# Patient Record
Sex: FEMALE | Race: WHITE | Employment: FULL TIME | URBAN - METROPOLITAN AREA
[De-identification: names, ages, dates, MRNs, and addresses within clinical notes are randomized per-mention and may not be internally consistent; named-entity substitution may affect disease eponyms.]

---

## 2019-08-19 ENCOUNTER — OFFICE VISIT (OUTPATIENT)
Dept: OBGYN CLINIC | Age: 19
End: 2019-08-19
Payer: COMMERCIAL

## 2019-08-19 VITALS
BODY MASS INDEX: 33.49 KG/M2 | HEIGHT: 65 IN | SYSTOLIC BLOOD PRESSURE: 120 MMHG | WEIGHT: 201 LBS | DIASTOLIC BLOOD PRESSURE: 84 MMHG

## 2019-08-19 DIAGNOSIS — N92.6 IRREGULAR MENSES: ICD-10-CM

## 2019-08-19 DIAGNOSIS — Z01.419 WELL WOMAN EXAM: Primary | ICD-10-CM

## 2019-08-19 PROCEDURE — 99385 PREV VISIT NEW AGE 18-39: CPT | Performed by: NURSE PRACTITIONER

## 2019-08-19 RX ORDER — NORETHINDRONE ACETATE AND ETHINYL ESTRADIOL 1MG-20(21)
1 KIT ORAL DAILY
Qty: 1 PACKET | Refills: 12 | Status: SHIPPED | OUTPATIENT
Start: 2019-08-19 | End: 2020-07-31 | Stop reason: ALTCHOICE

## 2019-08-19 SDOH — HEALTH STABILITY: MENTAL HEALTH: HOW OFTEN DO YOU HAVE A DRINK CONTAINING ALCOHOL?: MONTHLY OR LESS

## 2019-08-19 ASSESSMENT — PATIENT HEALTH QUESTIONNAIRE - PHQ9
SUM OF ALL RESPONSES TO PHQ QUESTIONS 1-9: 0
SUM OF ALL RESPONSES TO PHQ QUESTIONS 1-9: 0
SUM OF ALL RESPONSES TO PHQ9 QUESTIONS 1 & 2: 0
2. FEELING DOWN, DEPRESSED OR HOPELESS: 0
1. LITTLE INTEREST OR PLEASURE IN DOING THINGS: 0

## 2019-08-19 NOTE — PROGRESS NOTES
History and Physical  [address], [address] [facility] [address], [address] [facility] [phone]   Fax [phone]  Fatou Gonzales  2019              18 y.o. Chief Complaint   Patient presents with    Gynecologic Exam       Patient's last menstrual period was 2019. Primary Care Physician: No primary care provider on file. The patient was seen and examined. She has no chief complaint today and is here for her annual exam. Patient is here to establish care. Complaining of irregular menses for many years. Patient reports periods occur every 1-2 months. Sometimes will get 2 in one month. Currently sexually active. Desires to start on on oral contraception to regulate menses. Denies personal or family hx of blood clot to leg, lung or brain. Denies smoking. Her bowels are regular. There are no voiding complaints. She denies any bloating. She denies vaginal discharge and was counseled on STD's and the need for barrier contraception. HPI : Fatou Gonzales is a 22 y.o. female     Annual exam  Irregular menses- desires to start on OCPs for menstrual regulation  ________________________________________________________________________  OB History    Para Term  AB Living   0 0 0 0 0 0   SAB TAB Ectopic Molar Multiple Live Births   0 0 0 0 0 0     History reviewed. No pertinent past medical history.                                                                 Past Surgical History:   Procedure Laterality Date    KNEE SURGERY  2018     Family History   Problem Relation Age of Onset    No Known Problems Mother     No Known Problems Father     No Known Problems Sister     No Known Problems Brother     Colon Cancer Paternal Grandmother      Social History     Socioeconomic History    Marital status: Single     Spouse name: Not on file    Number of children: Not on file    Years of education: Not on file   Rich Qiu Limb Weakness  Dermatological ROS: No Rash, Itching, Hives, Mole Changes or Cancer                                                                                                                                                                                                                                  PHYSICAL Exam:     Constitutional:  Vitals:    08/19/19 0910   BP: 120/84   Site: Right Upper Arm   Position: Sitting   Cuff Size: Medium Adult   Weight: 201 lb (91.2 kg)   Height: 5' 5\" (1.651 m)         General Appearance: This  is a well Developed, well Nourished, well groomed female. Her BMI was reviewed. Nutritional decision making was discussed. Skin:  There was a Normal Inspection of the skin without rashes or lesions. There were no rashes. (Papular, Maculopapular, Hives, Pustular, Macular)     There were no lesions (Ulcers, Erythema, Abn. Appearing Nevi)            Lymphatic:  No Lymph Nodes were Palpable in the neck , axilla or groin.  0 # Of Lymph Nodes; Location ; Character [Normal]  [Shotty] [Tender] [Enlarged]     Neck and EENT:  The neck was supple. There were no masses   The thyroid was not enlarged and had no masses. Perrla, EOMI B/L, TMI B/L No Abnormalities. Throat inspected-No exudates or Masses, Nares Patent No Masses        Respiratory: The lungs were auscultated and found to be clear. There were no rales, rhonchi or wheezes. There was a good respiratory effort. Cardiovascular: The heart was in a regular rate and rhythm. . No S3 or S4. There was no murmur appreciated. Location, grade, and radiation are not applicable. Extremities: The patients extremities were without calf tenderness, edema, or varicosities. There was full range of motion in all four extremities. Pulses in all four extremities were appreciated and are 2/4. Abdomen: The abdomen was soft and non-tender. There were good bowel sounds in all quadrants and there was no guarding, rebound or rigidity.   On

## 2019-08-19 NOTE — PATIENT INSTRUCTIONS
take one as soon as you remember you forgot them. Then read the pill label or call your doctor about instructions on how to take your missed pills. Use a backup method of birth control or don't have intercourse for 7 days. Pregnancy is more likely if you miss more than 1 pill. · If you had intercourse, you can use emergency contraception to help prevent pregnancy. The most effective emergency contraception is the copper IUD (inserted by a doctor). You can also get emergency contraceptive pills without a prescription at [facility]. What else do you need to know? · The pill can have side effects. ? You may have very light or skipped periods. ? You may have bleeding between periods (spotting). This usually decreases after 3 to 4 months. ? You may have mood changes, less interest in sex, or weight gain. · The pill may reduce acne, heavy bleeding and cramping, and symptoms of premenstrual syndrome. · Check with your doctor before you use any other medicines, including over-the-counter medicines, vitamins, herbal products, and supplements. Birth control hormones may not work as well to prevent pregnancy when combined with other medicines. · The pill doesn't protect against sexually transmitted infection (STIs), such as herpes or HIV/AIDS. If you're not sure whether your sex partner might have an STI, use a condom to protect against disease. When should you call for help? Call your doctor now or seek immediate medical care if:    · You have severe belly pain.     · You have signs of a blood clot, such as:  ? Pain in your calf, back of the knee, thigh, or groin. ?  Redness and swelling in your leg or groin.     · You have blurred vision or other problems seeing.     · You have a severe headache.     · You have severe trouble breathing.    Watch closely for changes in your health, and be sure to contact your doctor if:    · You think you might be pregnant.     · You think you may be depressed.     · You think

## 2020-07-31 ENCOUNTER — HOSPITAL ENCOUNTER (OUTPATIENT)
Age: 20
Setting detail: SPECIMEN
Discharge: HOME | End: 2020-07-31
Payer: COMMERCIAL

## 2020-07-31 ENCOUNTER — OFFICE VISIT (OUTPATIENT)
Dept: OBGYN CLINIC | Age: 20
End: 2020-07-31
Payer: COMMERCIAL

## 2020-07-31 VITALS
TEMPERATURE: 97.6 F | SYSTOLIC BLOOD PRESSURE: 102 MMHG | BODY MASS INDEX: 32.49 KG/M2 | DIASTOLIC BLOOD PRESSURE: 60 MMHG | WEIGHT: 195 LBS | HEIGHT: 65 IN

## 2020-07-31 LAB
CONTROL: NORMAL
DIRECT EXAM: ABNORMAL
HBV SURFACE AB TITR SER: 43.27 MIU/ML
HEPATITIS C ANTIBODY: NONREACTIVE
HIV AG/AB: NONREACTIVE
Lab: ABNORMAL
PREGNANCY TEST URINE, POC: NEGATIVE
SPECIMEN DESCRIPTION: ABNORMAL
T. PALLIDUM, IGG: NONREACTIVE

## 2020-07-31 PROCEDURE — 36415 COLL VENOUS BLD VENIPUNCTURE: CPT

## 2020-07-31 PROCEDURE — 86780 TREPONEMA PALLIDUM: CPT

## 2020-07-31 PROCEDURE — 87389 HIV-1 AG W/HIV-1&-2 AB AG IA: CPT

## 2020-07-31 PROCEDURE — 99395 PREV VISIT EST AGE 18-39: CPT | Performed by: NURSE PRACTITIONER

## 2020-07-31 PROCEDURE — 86803 HEPATITIS C AB TEST: CPT

## 2020-07-31 PROCEDURE — 87480 CANDIDA DNA DIR PROBE: CPT

## 2020-07-31 PROCEDURE — 81025 URINE PREGNANCY TEST: CPT | Performed by: NURSE PRACTITIONER

## 2020-07-31 PROCEDURE — 87491 CHLMYD TRACH DNA AMP PROBE: CPT

## 2020-07-31 PROCEDURE — 86696 HERPES SIMPLEX TYPE 2 TEST: CPT

## 2020-07-31 PROCEDURE — 86694 HERPES SIMPLEX NES ANTBDY: CPT

## 2020-07-31 PROCEDURE — 87660 TRICHOMONAS VAGIN DIR PROBE: CPT

## 2020-07-31 PROCEDURE — 86317 IMMUNOASSAY INFECTIOUS AGENT: CPT

## 2020-07-31 PROCEDURE — 87591 N.GONORRHOEAE DNA AMP PROB: CPT

## 2020-07-31 PROCEDURE — 86695 HERPES SIMPLEX TYPE 1 TEST: CPT

## 2020-07-31 PROCEDURE — 87510 GARDNER VAG DNA DIR PROBE: CPT

## 2020-07-31 RX ORDER — NORGESTIMATE AND ETHINYL ESTRADIOL 7DAYSX3 28
1 KIT ORAL DAILY
Qty: 28 TABLET | Refills: 12 | Status: SHIPPED | OUTPATIENT
Start: 2020-07-31 | End: 2021-06-03

## 2020-07-31 ASSESSMENT — PATIENT HEALTH QUESTIONNAIRE - PHQ9
SUM OF ALL RESPONSES TO PHQ9 QUESTIONS 1 & 2: 0
1. LITTLE INTEREST OR PLEASURE IN DOING THINGS: 0
SUM OF ALL RESPONSES TO PHQ QUESTIONS 1-9: 0
2. FEELING DOWN, DEPRESSED OR HOPELESS: 0
SUM OF ALL RESPONSES TO PHQ QUESTIONS 1-9: 0

## 2020-07-31 NOTE — PROGRESS NOTES
History and Physical  [address], [address] [facility] [address], [address] [facility] [phone]   Fax [phone]  Manolo Mcmanus  2020              19 y.o. Chief Complaint   Patient presents with    Gynecologic Exam       Patient's last menstrual period was 2020. Primary Care Physician: No primary care provider on file. The patient was seen and examined. She has no chief complaint today and is here for her annual exam.  Her bowels are regular. There are no voiding complaints. She denies any bloating. She denies vaginal discharge and was counseled on STD's and the need for barrier contraception. HPI : Manolo Mcmanus is a 20 y.o. female [facility]    Annual exam  Complaining of irregular menses while on OCPs   Sometimes misses pills but desires to continue on OCP after discussing different methods. Last intercourse was several months ago. Completely stopped taking pills 1 month ago to see how her periods are without taking them, interested in restarting pill. Will change OCP to see if it helps with intermenstrual bleeding. Hx of chlamydia in urine in May at [facility] [place].   Did not retest.  Desires STD screening  ________________________________________________________________________  OB History    Para Term  AB Living   0 0 0 0 0 0   SAB TAB Ectopic Molar Multiple Live Births   0 0 0 0 0 0     Past Medical History:   Diagnosis Date    Chlamydia contact, treated 2020                                                                   Past Surgical History:   Procedure Laterality Date    KNEE SURGERY  2018     Family History   Problem Relation Age of Onset    No Known Problems Mother     No Known Problems Father     No Known Problems Sister     No Known Problems Brother     Colon Cancer Paternal Grandmother      Social History     Socioeconomic History    Marital status: Single     Spouse name: Not on file    Number of children: Not on file    Years of education: Not on file    Highest education level: Not on file   Occupational History    Not on file   Social Needs    Financial resource strain: Not on file    Food insecurity     Worry: Not on file     Inability: Not on file    Transportation needs     Medical: Not on file     Non-medical: Not on file   Tobacco Use    Smoking status: Never Smoker    Smokeless tobacco: Never Used   Substance and Sexual Activity    Alcohol use: Yes     Frequency: Monthly or less    Drug use: Never    Sexual activity: Yes   Lifestyle    Physical activity     Days per week: Not on file     Minutes per session: Not on file    Stress: Not on file   Relationships    Social connections     Talks on phone: Not on file     Gets together: Not on file     Attends Mandaeism service: Not on file     Active member of club or organization: Not on file     Attends meetings of clubs or organizations: Not on file     Relationship status: Not on file    Intimate partner violence     Fear of current or ex partner: Not on file     Emotionally abused: Not on file     Physically abused: Not on file     Forced sexual activity: Not on file   Other Topics Concern    Not on file   Social History Narrative    Not on file       MEDICATIONS:  Current Outpatient Medications   Medication Sig Dispense Refill    Norgestim-Eth Estrad Triphasic (ORTHO TRI-CYCLEN, 28,) 0.18/0.215/0.25 MG-35 MCG TABS Take 1 Package by mouth daily for 28 days Barrier contraception is recommended. 28 tablet 12     No current facility-administered medications for this visit.             ALLERGIES:  Allergies as of 07/31/2020    (No Known Allergies)       Symptoms of decreased mood absent  Symptoms of anhedonia absent    **If either question is answered in a  positive fashion then complete the PHQ9 Scoring Evaluation and make the appropriate referral**      Immunization status: stated as current, but no records available. Gynecologic History:  Menarche: 6 yo  Menopause at [address] [place] yo     Patient's last menstrual period was 07/26/2020. Sexually Active: Yes    STD History: Yes hx chlamydia in May     Permanent Sterilization: No   Reversible Birth Control: No        Hormone Replacement Exposure: No      Genetic Qualified Family History of Breast, Ovarian , Colon or Uterine Cancer: No     If YES see scanned worksheet. Preventative Health Testing:    Health Maintenance:  Health Maintenance Due   Topic Date Due    Varicella vaccine (1 of 2 - 2-dose childhood series) 11/05/2000    HPV vaccine (1 - 2-dose series) 11/05/2010    HIV screen  11/05/2014    Chlamydia screen  11/05/2015    DTaP/Tdap/Td vaccine (1 - Tdap) 11/05/2018       Date of Last Pap Smear: NA  Abnormal Pap Smear History: NA  Colposcopy History:   Date of Last Mammogram: NA  Date of Last Colonoscopy:   Date of Last Bone Density:      ________________________________________________________________________        REVIEW OF SYSTEMS:    yes   A minimum of an eleven point review of systems was completed. Review Of Systems (11 point):  Constitutional: No fever, chills or malaise; No weight change or fatigue  Head and Eyes: No vision, Headache, Dizziness or trauma in last 12 months  ENT ROS: No hearing, Tinnitis, sinus or taste problems  Hematological and Lymphatic ROS:No Lymphoma, Von Willebrand's, Hemophillia or Bleeding History  Psych ROS: No Depression, Homicidal thoughts,suicidal thoughts, or anxiety  Breast ROS: No prior breast abnormalities or lumps  Respiratory ROS: No SOB, Pneumoniae,Cough, or Pulmonary Embolism History  Cardiovascular ROS: No Chest Pain with Exertion, Palpitations, Syncope, Edema, Arrhythmia  Gastrointestinal ROS: No Indigestion, Heartburn, Nausea, vomiting, Diarrhea, Constipation,or Bowel Changes; No Bloody Stools or melena  Genito-Urinary ROS: No Dysuria, Hematuria or Nocturia.  No Urinary Incontinence or Vaginal Discharge  Musculoskeletal ROS: No Arthralgia, Arthritis,Gout,Osteoporosis or Rheumatism  Neurological ROS: No CVA, Migraines, Epilepsy, Seizure Hx, or Limb Weakness  Dermatological ROS: No Rash, Itching, Hives, Mole Changes or Cancer                                                                                                                                                                                                                                  PHYSICAL Exam:     Constitutional:  Vitals:    07/31/20 0908   BP: 102/60   Site: Right Upper Arm   Position: Sitting   Cuff Size: Medium Adult   Temp: 97.6 °F (36.4 °C)   Weight: 195 lb (88.5 kg)   Height: 5' 5\" (1.651 m)         General Appearance: This  is a well Developed, well Nourished, well groomed female. Her BMI was reviewed. Nutritional decision making was discussed. Skin:  There was a Normal Inspection of the skin without rashes or lesions. There were no rashes. (Papular, Maculopapular, Hives, Pustular, Macular)     There were no lesions (Ulcers, Erythema, Abn. Appearing Nevi)            Lymphatic:  No Lymph Nodes were Palpable in the neck , axilla or groin.  0 # Of Lymph Nodes; Location ; Character [Normal]  [Shotty] [Tender] [Enlarged]     Neck and EENT:  The neck was supple. There were no masses   The thyroid was not enlarged and had no masses. Perrla, EOMI B/L, TMI B/L No Abnormalities. Throat inspected-No exudates or Masses, Nares Patent No Masses        Respiratory: The lungs were auscultated and found to be clear. There were no rales, rhonchi or wheezes. There was a good respiratory effort. Cardiovascular: The heart was in a regular rate and rhythm. . No S3 or S4. There was no murmur appreciated. Location, grade, and radiation are not applicable. Extremities: The patients extremities were without calf tenderness, edema, or varicosities. There was full range of motion in all four extremities.  Pulses in all four extremities were appreciated and are 2/4. Abdomen: The abdomen was soft and non-tender. There were good bowel sounds in all quadrants and there was no guarding, rebound or rigidity. On evaluation there was no evidence of hepatosplenomegaly and there was no costal vertebral landy tenderness bilaterally. No hernias were appreciated. Abdominal Scars: intact    Psych: The patient had a normal Orientation to: Time, Place, Person, and Situation  There is no Mood / Affect changes    Breast:  (Chest)  declines  Self breast exams were reviewed in detail. Literature was given. Pelvic Exam:  Vulva and vagina appear normal. Bimanual exam reveals normal uterus and adnexa. Rectal Exam:  exam declined by patient          Musculosk:  Normal Gait and station was noted. Digits were evaluated without abnormal findings. Range of motion, stability and strength were evaluated and found to be appropriate for the patients age. ASSESSMENT:      20 y.o. Annual   Diagnosis Orders   1. Well woman exam     2. Screening for STDs (sexually transmitted diseases)  C.trachomatis N.gonorrhoeae DNA    VAGINITIS DNA PROBE    Hepatitis C Antibody    HERPES PROFILE    HIV Screen    T. pallidum Ab    Hepatitis B Surface Antibody   3. Irregular periods  POCT urine pregnancy          Chief Complaint   Patient presents with    Gynecologic Exam          Past Medical History:   Diagnosis Date    Chlamydia contact, treated 05/2020         There are no active problems to display for this patient. Hereditary Breast, Ovarian, Colon and Uterine Cancer screening Done. Tobacco & Secondary smoke risks reviewed; instructed on cessation and avoidance      Counseling Completed:  Preventative Health Recommendations and Follow up. The patient was informed of the recommended preventative health recommendations. 1. Annuals every year; Cytology collections per prevailing guidelines.    2. Mammograms begin every year at 37 yo if no abnormalities are found and no family history. 3. Bone density studies every 2-3 years. Begin at 71 yo. If no fracture history or osteoporosis family history. (significant). 4. Colonoscopy begin at 38 yo. Repeat every ten years if negative and no family history. 5. Calcium of 6783-0890 mg/day in split dosing  6. Vitamin D 400-800 IU/day  7. All other preventative health recommendations will be managed by the patients Primary care physician. Counseling Hormonal Based Birth Control:      The patient was seen and counseled on all forms of birth control both male and female  reversible and non. She is aware that hormonal based birth control may increase her risk of developing a blood clot which may increase her morbidity and or mortality. She was counseled on alternate non hormonal based contraception options. We discussed that smoking and any hormonal based contraception may increase the patients risks of developing these life threatening blood clots. All patients are encouraged to stop smoking at the time of contraceptive counseling. Cessation programs were reviewed. The patient was instructed to use barrier contraception for sexually transmitted disease prevention. The patient was also informed of antibiotics decreasing contraceptive efficacy and the need for barrier contraception from the onset of her antibiotic dosing and through a minimum of thirty days from antibiotic cessation. The life threatening side effect profile was reviewed in detail this includes but is not limited to shortness of breath, chest pain, severe or persistent headaches, or calf pain. If any of these occur the patient has been instructed to stop using her hormonal based contraception, notify the office, and go to the emergency department or call 911.     The patient denied any personal history of blood clots in her leg, lung, or heart and denied any family history of stroke, TIA, sudden cardiac death < 36 y.o.,pulmonary embolism, or deep venous thrombosis. PLAN:  Return in about 1 year (around 7/31/2021) for annual.   Vaginal cultures collected. Lab slips given. Urine pregnancy test negative. Prescription for Ortho tri-cyclen sent to pharmacy. Repeat Annual every 1 year  Cervical Cytology Evaluation begins at 24years old. If Negative Cytology, Follow-up screening per current guidelines. Mammograms every 1 year. If 37 yo and last mammogram was negative. Calcium and Vitamin D dosing reviewed. Colonoscopy screening reviewed as well as onset for bone density testing. Birth control and barrier recommendations discussed. STD counseling and prevention reviewed. Gardisil counseling completed for all patients 10-37 yo. Routine health maintenance per patients PCP.   Orders Placed This Encounter   Procedures    C.trachomatis N.gonorrhoeae DNA     Standing Status:   Future     Number of Occurrences:   1     Standing Expiration Date:   7/31/2021    VAGINITIS DNA PROBE     Standing Status:   Future     Number of Occurrences:   1     Standing Expiration Date:   7/31/2021    Hepatitis C Antibody     Standing Status:   Future     Number of Occurrences:   1     Standing Expiration Date:   7/31/2021    HERPES PROFILE     Standing Status:   Future     Number of Occurrences:   1     Standing Expiration Date:   8/30/2020    HIV Screen     Standing Status:   Future     Number of Occurrences:   1     Standing Expiration Date:   7/31/2021    T. pallidum Ab     Standing Status:   Future     Number of Occurrences:   1     Standing Expiration Date:   8/30/2020    Hepatitis B Surface Antibody     Standing Status:   Future     Number of Occurrences:   1     Standing Expiration Date:   7/31/2021    POCT urine pregnancy

## 2020-08-03 ENCOUNTER — TELEPHONE (OUTPATIENT)
Dept: OBGYN CLINIC | Age: 20
End: 2020-08-03

## 2020-08-03 LAB
C TRACH DNA GENITAL QL NAA+PROBE: NEGATIVE
N. GONORRHOEAE DNA: NEGATIVE
SPECIMEN DESCRIPTION: NORMAL

## 2020-08-03 RX ORDER — METRONIDAZOLE 500 MG/1
500 TABLET ORAL 2 TIMES DAILY
Qty: 14 TABLET | Refills: 0 | Status: SHIPPED | OUTPATIENT
Start: 2020-08-03 | End: 2020-08-10

## 2020-08-03 NOTE — TELEPHONE ENCOUNTER
----- Message from RIAZ Oscar - CNP sent at 8/3/2020  7:41 AM EDT -----  +BV- flagyl 500mg PO BID x 7days  + hep B antibodies- please ask patient if she previously received hep B vaccine. If not, let me know.

## 2020-08-03 NOTE — TELEPHONE ENCOUNTER
Per Johnnye Lanes pt notified of +BV on culture and pt to get Flagyl 500mg sent to pt pharm. 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

## 2020-08-04 ENCOUNTER — TELEPHONE (OUTPATIENT)
Dept: OBGYN CLINIC | Age: 20
End: 2020-08-04

## 2020-08-04 LAB
HERPES SIMPLEX VIRUS 1 IGG: 0.31
HERPES SIMPLEX VIRUS 2 IGG: 0.02
HERPES TYPE 1/2 IGM COMBINED: 0.93

## 2020-08-04 NOTE — TELEPHONE ENCOUNTER
Patient notified of results and recommendations, patient not currently having any symptoms, will call the office if symptoms develop.

## 2020-08-04 NOTE — TELEPHONE ENCOUNTER
----- Message from RIAZ Dc CNP sent at 8/4/2020  2:33 PM EDT -----  Herpes type 1/2 IgM combined - equivocal.  If patient is having symptoms, please treat with valtrex 1 gram PO BID 10 days. If patient would like- may repeat herpes profile in 3-6 months to see if herpes is positive.

## 2020-12-03 ENCOUNTER — TELEPHONE (OUTPATIENT)
Dept: OBGYN CLINIC | Age: 20
End: 2020-12-03

## 2020-12-03 RX ORDER — FLUCONAZOLE 150 MG/1
150 TABLET ORAL ONCE
Qty: 2 TABLET | Refills: 0 | Status: SHIPPED | OUTPATIENT
Start: 2020-12-03 | End: 2020-12-03

## 2021-06-03 ENCOUNTER — OFFICE VISIT (OUTPATIENT)
Dept: OBGYN CLINIC | Age: 21
End: 2021-06-03
Payer: COMMERCIAL

## 2021-06-03 ENCOUNTER — HOSPITAL ENCOUNTER (OUTPATIENT)
Age: 21
Setting detail: SPECIMEN
Discharge: HOME | End: 2021-06-03
Payer: COMMERCIAL

## 2021-06-03 VITALS
HEIGHT: 65 IN | WEIGHT: 216 LBS | SYSTOLIC BLOOD PRESSURE: 114 MMHG | BODY MASS INDEX: 35.99 KG/M2 | DIASTOLIC BLOOD PRESSURE: 70 MMHG

## 2021-06-03 DIAGNOSIS — Z20.828 HERPES EXPOSURE: ICD-10-CM

## 2021-06-03 DIAGNOSIS — Z20.828 HERPES EXPOSURE: Primary | ICD-10-CM

## 2021-06-03 PROCEDURE — G8428 CUR MEDS NOT DOCUMENT: HCPCS | Performed by: NURSE PRACTITIONER

## 2021-06-03 PROCEDURE — 86694 HERPES SIMPLEX NES ANTBDY: CPT

## 2021-06-03 PROCEDURE — 99213 OFFICE O/P EST LOW 20 MIN: CPT | Performed by: NURSE PRACTITIONER

## 2021-06-03 PROCEDURE — G8417 CALC BMI ABV UP PARAM F/U: HCPCS | Performed by: NURSE PRACTITIONER

## 2021-06-03 PROCEDURE — 86696 HERPES SIMPLEX TYPE 2 TEST: CPT

## 2021-06-03 PROCEDURE — 86695 HERPES SIMPLEX TYPE 1 TEST: CPT

## 2021-06-03 PROCEDURE — 4004F PT TOBACCO SCREEN RCVD TLK: CPT | Performed by: NURSE PRACTITIONER

## 2021-06-03 PROCEDURE — 36415 COLL VENOUS BLD VENIPUNCTURE: CPT

## 2021-06-03 RX ORDER — ARIPIPRAZOLE 2 MG/1
TABLET ORAL
COMMUNITY
Start: 2021-05-13 | End: 2022-09-07

## 2021-06-03 RX ORDER — NORGESTIMATE AND ETHINYL ESTRADIOL 7DAYSX3 28
1 KIT ORAL
COMMUNITY
Start: 2020-07-31 | End: 2021-07-27 | Stop reason: SDUPTHER

## 2021-06-03 NOTE — PROGRESS NOTES
Shar Feritas  6/3/2021    YOB: 2000          The patient was seen today. She is here regarding orders for herp profile. Last herpes profile was completed in 2020 and was equivical for IgM. Desires to determine if/what type of HSV she has. States she may have had a lesion on her lip. Her bowels are regular and she is voiding without difficulty. HPI:  Shar Freitas is a 23 y.o. female  orders for herpes profile       OB History    Para Term  AB Living   0 0 0 0 0 0   SAB TAB Ectopic Molar Multiple Live Births   0 0 0 0 0 0       Past Medical History:   Diagnosis Date    Chlamydia contact, treated 2020       Past Surgical History:   Procedure Laterality Date    KNEE SURGERY  2018       Family History   Problem Relation Age of Onset    No Known Problems Mother     No Known Problems Father     No Known Problems Sister     No Known Problems Brother     Colon Cancer Paternal Grandmother        Social History     Socioeconomic History    Marital status: Single     Spouse name: Not on file    Number of children: Not on file    Years of education: Not on file    Highest education level: Not on file   Occupational History    Not on file   Tobacco Use    Smoking status: Current Some Day Smoker    Smokeless tobacco: Never Used    Tobacco comment: socially   Substance and Sexual Activity    Alcohol use: Yes    Drug use: Never    Sexual activity: Yes   Other Topics Concern    Not on file   Social History Narrative    Not on file     Social Determinants of Health     Financial Resource Strain:     Difficulty of Paying Living Expenses:    Food Insecurity:     Worried About Running Out of Food in the Last Year:     [address] in the Last Year:    Transportation Needs:     Lack of Transportation (Medical):      Lack of Transportation (Non-Medical):    Physical Activity:     Days of Exercise per Week:     Minutes of Exercise per Epilepsy, Seizure Hx, or Limb Weakness  Dermatological ROS: No Rash, Itching, Hives, Mole Changes or Cancer          Blood pressure 114/70, height 5' 5\" (1.651 m), weight 216 lb (98 kg), last menstrual period 06/02/2021, not currently breastfeeding.           Abdomen: Soft non-tender; good bowel sounds. No guarding, rebound or rigidity. No CVA tenderness bilaterally. Extremities: No calf tenderness, DTR 2/4, and No edema bilaterally    Pelvic: Exam deferred. Diagnostics:  No results found. Lab Results:  Results for orders placed or performed during the hospital encounter of 07/31/20   VAGINITIS DNA PROBE    Specimen: Vaginal   Result Value Ref Range    Specimen Description . VAGINA     Special Requests NOT REPORTED     Direct Exam POSITIVE for Gardnerella vaginalis. (A)     Direct Exam NEGATIVE for Trichomonas vaginalis     Direct Exam NEGATIVE for Candida sp. Direct Exam       Method of testing is a DNA probe intended for detection and identification of Candida species, Gardnerella vaginalis, and Trichomonas vaginalis nucleic acid in vaginal fluid specimens from patients with symptoms of vaginitis/vaginosis. C.trachomatis N.gonorrhoeae DNA    Specimen: Cervix   Result Value Ref Range    Specimen Description . CERVIX     C. trachomatis DNA NEGATIVE NEGATIVE    N. gonorrhoeae DNA NEGATIVE NEGATIVE   Hepatitis C Antibody   Result Value Ref Range    Hepatitis C Ab NONREACTIVE NONREACTIVE   HERPES PROFILE   Result Value Ref Range    HSV I IgG 0.31 <0.91    HSV II IgG 0.02 <0.91    Herpes Type 1/2 IgM Combined 0.93 (H) <0.91   HIV Screen   Result Value Ref Range    HIV Ag/Ab NONREACTIVE NONREACTIVE   T. pallidum Ab   Result Value Ref Range    T. pallidum, IgG NONREACTIVE NONREACTIVE   Hepatitis B Surface Antibody   Result Value Ref Range    Hep B S Ab 43.27 (H) <10 mIU/mL         Assessment:   Diagnosis Orders   1.  Herpes exposure  HERPES PROFILE     There are no problems to display for this

## 2021-06-08 LAB
HERPES SIMPLEX VIRUS 1 IGG: 0.34
HERPES SIMPLEX VIRUS 2 IGG: 0.06
HERPES TYPE 1/2 IGM COMBINED: 0.79

## 2021-07-27 RX ORDER — PHENTERMINE HYDROCHLORIDE 37.5 MG/1
TABLET ORAL
COMMUNITY
Start: 2021-07-13 | End: 2022-09-07

## 2021-07-27 RX ORDER — NORGESTIMATE AND ETHINYL ESTRADIOL 7DAYSX3 28
1 KIT ORAL DAILY
Qty: 28 TABLET | Refills: 11 | Status: SHIPPED | OUTPATIENT
Start: 2021-07-27 | End: 2022-09-07

## 2021-07-27 NOTE — TELEPHONE ENCOUNTER
Pt requesting refill for birth control, was just seen in office 6/3 ,pt states perMarcellina Ano, wouldn't be due in until next year.

## 2022-07-27 DIAGNOSIS — N92.6 MISSED PERIOD: Primary | ICD-10-CM

## 2022-07-29 ENCOUNTER — HOSPITAL ENCOUNTER (OUTPATIENT)
Age: 22
Setting detail: SPECIMEN
Discharge: HOME | End: 2022-07-29

## 2022-07-29 DIAGNOSIS — N92.6 MISSED PERIOD: ICD-10-CM

## 2022-07-29 LAB — HCG QUANTITATIVE: 5513 MIU/ML

## 2022-08-01 ENCOUNTER — TELEPHONE (OUTPATIENT)
Dept: OBGYN CLINIC | Age: 22
End: 2022-08-01

## 2022-08-01 DIAGNOSIS — O03.9 SAB (SPONTANEOUS ABORTION): Primary | ICD-10-CM

## 2022-08-01 NOTE — TELEPHONE ENCOUNTER
Called patient to discuss results, patient notified me that she actually went to the ER for vaginal bleeding which she was told then that her HCG levels were actually decreasing and was consistent with a failed pregnancy. Spoke with Fantasma Villeda who recommends patient repeat labs in 2 weeks, repeat every 2 weeks until negative. Abstain from intercourse until negative quant. Wait 3 normal cycles before trying to conceive. Patent should present to the ER with fever, abdominal pain, or heavy bleeding. Order in epic for repeat HCG. Patient voiced understanding.

## 2022-08-01 NOTE — TELEPHONE ENCOUNTER
----- Message from RIAZ Bauer CNP sent at 8/1/2022  7:47 AM EDT -----  Please schedule for new OB intake/ ultrasound  Prenatal vitamin 1 PO qD with 12 refills

## 2022-08-16 ENCOUNTER — HOSPITAL ENCOUNTER (OUTPATIENT)
Age: 22
Setting detail: SPECIMEN
Discharge: HOME | End: 2022-08-16

## 2022-08-16 DIAGNOSIS — O03.9 SAB (SPONTANEOUS ABORTION): ICD-10-CM

## 2022-08-16 LAB — HCG QUANTITATIVE: 4 MIU/ML

## 2022-08-17 ENCOUNTER — TELEPHONE (OUTPATIENT)
Dept: OBGYN CLINIC | Age: 22
End: 2022-08-17

## 2022-08-17 NOTE — TELEPHONE ENCOUNTER
----- Message from RIAZ Rao CNP sent at 8/17/2022  7:52 AM EDT -----  Maria Esther Graff HCG is less than 5. Patient recently had miscarriage. Has not been seen in office since over 1 year. Please call patient and make an appointment for annual exam/ pap smear.

## 2022-08-17 NOTE — TELEPHONE ENCOUNTER
Tried calling patient regarding Lynda Macdonald results and recommendations but no answer and mailbox full. I will message patient via [employer] and try calling again later. Quant HCG is less than 5. Patient recently had miscarriage. Has not been seen in office since over 1 year.   Please call patient and make an appointment for annual exam/ pap smear

## 2022-08-17 NOTE — TELEPHONE ENCOUNTER
----- Message from RIAZ Neil CNP sent at 8/17/2022  7:52 AM EDT -----  Andreas Schilder HCG is less than 5. Patient recently had miscarriage. Has not been seen in office since over 1 year. Please call patient and make an appointment for annual exam/ pap smear.

## 2022-08-17 NOTE — TELEPHONE ENCOUNTER
Per Zhang Trujillo pt notified of Dorothey Merl results and pt  stating she is feeling fine. Pt scheduled for annual/pap.

## 2022-09-07 ENCOUNTER — OFFICE VISIT (OUTPATIENT)
Dept: OBGYN CLINIC | Age: 22
End: 2022-09-07
Payer: COMMERCIAL

## 2022-09-07 VITALS
DIASTOLIC BLOOD PRESSURE: 74 MMHG | BODY MASS INDEX: 35.99 KG/M2 | SYSTOLIC BLOOD PRESSURE: 116 MMHG | HEIGHT: 65 IN | WEIGHT: 216 LBS

## 2022-09-07 DIAGNOSIS — Z83.49 FAMILY HISTORY OF MTHFR DEFICIENCY: ICD-10-CM

## 2022-09-07 DIAGNOSIS — Z84.2 FAMILY HISTORY OF PCOS: ICD-10-CM

## 2022-09-07 DIAGNOSIS — Z01.419 WELL FEMALE EXAM WITH ROUTINE GYNECOLOGICAL EXAM: Primary | ICD-10-CM

## 2022-09-07 PROCEDURE — 99395 PREV VISIT EST AGE 18-39: CPT | Performed by: NURSE PRACTITIONER

## 2022-09-07 RX ORDER — ATOMOXETINE 25 MG/1
CAPSULE ORAL
COMMUNITY
Start: 2022-08-26 | End: 2022-10-31

## 2022-09-07 NOTE — PROGRESS NOTES
History and Physical  [address], [address] [facility] [address], [address] [facility] [phone]   Fax [phone]  Brooklyn Jean-Baptiste  2022              21 y.o. Chief Complaint   Patient presents with    Annual Exam       Patient's last menstrual period was 2022. Primary Care Physician: No primary care provider on file. The patient was seen and examined. She is here for her annual exam. States she had a SAB at the end of 2022. Desires to TTC again. Desires screening for PCOS and MTHFR as both her sisters have both. Menses are monthly, lasting 7 days, heavy to moderate flow. Currently on last day of menses. Desires pap today. Her bowels are regular. There are no voiding complaints. She denies any bloating. She denies vaginal discharge and was counseled on STD's and the need for barrier contraception.      HPI : Brooklyn Jean-Baptiste is a 24 y.o. female [address]    Annual exam  Recent SAB  Fam hx PCOS  Fam hx MTHFR    no Bloating  no Early Satiety  no Unexplained weight change of more than 15 lbs  no  PMB  no  PCB  ________________________________________________________________________  OB History    Para Term  AB Living   1 0 0 0 1 0   SAB IAB Ectopic Molar Multiple Live Births   1 0 0 0 0 0      # Outcome Date GA Lbr Khris/2nd Weight Sex Delivery Anes PTL Lv   1 SAB 2022             Past Medical History:   Diagnosis Date    Chlamydia contact, treated 2020                                                                   Past Surgical History:   Procedure Laterality Date    KNEE SURGERY  2018     Family History   Problem Relation Age of Onset    No Known Problems Mother     No Known Problems Father     No Known Problems Sister     No Known Problems Brother     Colon Cancer Paternal Grandmother      Social History     Socioeconomic History    Marital status: Single     Spouse name: Not on file    Number of children: Not on file    Years of education: Not on file    Highest education level: Not on file   Occupational History    Not on file   Tobacco Use    Smoking status: Some Days    Smokeless tobacco: Never    Tobacco comments:     socially   Substance and Sexual Activity    Alcohol use: Yes    Drug use: Never    Sexual activity: Yes   Other Topics Concern    Not on file   Social History Narrative    Not on file     Social Determinants of Health     Financial Resource Strain: Not on file   Food Insecurity: Not on file   Transportation Needs: Not on file   Physical Activity: Not on file   Stress: Not on file   Social Connections: Not on file   Intimate Partner Violence: Not on file   Housing Stability: Not on file       MEDICATIONS:  Current Outpatient Medications   Medication Sig Dispense Refill    atomoxetine (STRATTERA) 25 MG capsule TAKE 1 CAPSULE BY MOUTH IN THE MORNING       No current facility-administered medications for this visit. ALLERGIES:  Allergies as of 09/07/2022    (No Known Allergies)       Symptoms of decreased mood absent  Symptoms of anhedonia absent    **If either question is answered in a  positive fashion then complete the PHQ9 Scoring Evaluation and make the appropriate referral**      Immunization status: stated as current, but no records available. Gynecologic History:  Menarche: 4 yo  Menopause at [address] [place] yo     Patient's last menstrual period was 09/03/2022. Sexually Active: Yes    STD History: Yes CT     Permanent Sterilization: No   Reversible Birth Control: No        Hormone Replacement Exposure: No      Genetic Qualified Family History of Breast, Ovarian , Colon or Uterine Cancer: Yes PGM colon cancer age unknown      If YES see scanned worksheet.     Preventative Health Testing:    Health Maintenance:  Health Maintenance Due   Topic Date Due    Pneumococcal 0-64 years Vaccine (1 - PCV) Never done    Depression Screen  Never done    Pap smear  Never done    Chlamydia screen  07/31/2021 COVID-19 Vaccine (3 - Booster for Pfizer series) 05/22/2022    DTaP/Tdap/Td vaccine (7 - Tdap) 08/14/2022    Flu vaccine (1) 09/01/2022       Date of Last Pap Smear: na  Abnormal Pap Smear History: na  Colposcopy History:   Date of Last Mammogram: na  Date of Last Colonoscopy:   Date of Last Bone Density:      ________________________________________________________________________        REVIEW OF SYSTEMS:    see problem list   A minimum of an eleven point review of systems was completed. Review Of Systems (11 point):  Constitutional: No fever, chills or malaise; No weight change or fatigue  Head and Eyes: No vision changes, Headache, Dizziness or trauma in last 12 months  ENT ROS: No hearing, Tinnitis, sinus or taste problems  Hematological and Lymphatic ROS:No Lymphoma, Von Willebrand's, Hemophillia or Bleeding History  Psych ROS: No Depression, Homicidal thoughts,suicidal thoughts, or anxiety  Breast ROS: No breast abnormalities or lumps  Respiratory ROS: No SOB, Pneumoniae,Cough, or Pulmonary Embolism   Cardiovascular ROS: No Chest Pain with Exertion, Palpitations, Syncope, Edema, Arrhythmia  Gastrointestinal ROS: No Indigestion, Heartburn, Nausea, vomiting, Diarrhea, Constipation,or Bowel Changes; No Bloody Stools or melena  Genito-Urinary ROS: No Dysuria, Hematuria or Nocturia.  No Urinary Incontinence or Vaginal Discharge  Musculoskeletal ROS: No Arthralgia, Arthritis,Gout,Osteoporosis or Rheumatism  Neurological ROS: No CVA, Migraines, Epilepsy, Seizure Hx, or Limb Weakness  Dermatological ROS: No Rash, Itching, Hives, Mole Changes or Cancer                                                                                                                                                                                                                                  PHYSICAL Exam:     Constitutional:  Vitals:    09/07/22 1434   BP: 116/74   Site: Left Upper Arm   Position: Sitting   Cuff Size: Medium Adult Weight: 216 lb (98 kg)   Height: 5' 5\" (1.651 m)       Chaperone for Intimate Exam  Chaperone was offered and accepted as part of the rooming process. Chaperone: Mary PATEL          General Appearance: This  is a well Developed, well Nourished, well groomed female. Her BMI was reviewed. Nutritional decision making was discussed. Skin:  There was a Normal Inspection of the skin without rashes or lesions. There were no rashes. (Papular, Maculopapular, Hives, Pustular, Macular)     There were no lesions (Ulcers, Erythema, Abn. Appearing Nevi)            Lymphatic:  No Lymph Nodes were Palpable in the neck , axilla or groin.  0 # Of Lymph Nodes; Location ; Character [Normal]  [Shotty] [Tender] [Enlarged]     Neck and EENT:  The neck was supple. There were no masses   The thyroid was not enlarged and had no masses. Perrla, EOMI B/L, TMI B/L No Abnormalities. Throat inspected-No exudates or Masses, Nares Patent No Masses        Respiratory: The lungs were auscultated and found to be clear. There were no rales, rhonchi or wheezes. There was a good respiratory effort. Cardiovascular: The heart was in a regular rate and rhythm. . No S3 or S4. There was no murmur appreciated. Location, grade, and radiation are not applicable. Extremities: The patients extremities were without calf tenderness, edema, or varicosities. There was full range of motion in all four extremities. Pulses in all four extremities were appreciated and are 2/4. Abdomen: The abdomen was soft and non-tender. There were good bowel sounds in all quadrants and there was no guarding, rebound or rigidity. On evaluation there was no evidence of hepatosplenomegaly and there was no costal vertebral landy tenderness bilaterally. No hernias were appreciated. Abdominal Scars: none    Psych:   The patient had a normal Orientation to: Time, Place, Person, and Situation  There is no Mood / Affect changes    Breast:  (Chest)  normal appearance, no masses or tenderness, Inspection negative, No nipple retraction or dimpling, No nipple discharge or bleeding, No axillary or supraclavicular adenopathy, Normal to palpation without dominant masses  Self breast exams were reviewed in detail. Literature was given. Pelvic Exam:  External genitalia: normal general appearance  Urinary system: urethral meatus normal  Vaginal: normal mucosa without prolapse or lesions  Cervix: normal appearance  Adnexa: normal bimanual exam  Uterus: normal single, nontender    Rectal Exam:  exam declined by patient          Musculosk:  Normal Gait and station was noted. Digits were evaluated without abnormal findings. Range of motion, stability and strength were evaluated and found to be appropriate for the patients age. ASSESSMENT:      24 y.o. Annual   Diagnosis Orders   1. Well female exam with routine gynecological exam  PAP SMEAR      2. Family history of PCOS  Follicle Stimulating Hormone    Luteinizing Hormone    Glucose, Fasting    Hemoglobin A1C    Insulin, total    ALT    AST    BUN & Creatinine    Glucose, Random      3. Family history of MTHFR deficiency  LEX    Sjogrens syndrome-A extractable nuclear antibody    Sjogrens syndrome-B extractable nuclear antibody    Beta-2 Glycoprotein Antibodies    Antithrombin 3 Activity    Antithrombin 3 Antigen    Factor 5 Leiden    Homocysteine    Lupus Anticoagulant    MTHFR Mutation 677T/[id number]    Protein C Functional    Protein C Antigen, Total    Protein S Antigen, Free    Protein S Activity    Prothrombin 41461 Mutation Invader    T. pallidum Ab             Chief Complaint   Patient presents with    Annual Exam          Past Medical History:   Diagnosis Date    Chlamydia contact, treated 05/2020         There are no problems to display for this patient. Hereditary Breast, Ovarian, Colon and Uterine Cancer screening Done.           Tobacco & Secondary smoke risks reviewed; instructed on cessation and avoidance      Counseling Completed:  Preventative Health Recommendations and Follow up. The patient was informed of the recommended preventative health recommendations. 1. Annuals every year; Cytology collections per prevailing guidelines. 2. Mammograms begin every year at 35 yo if no abnormalities are found and no family history. 3. Bone density studies every 2-3 years. Begin at 73 yo. If no fracture history or osteoporosis family history. (significant). 4. Colonoscopy begin at 38 yo. Repeat every ten years if negative and no family history. 5. Calcium of 5301-1191 mg/day in split dosing  6. Vitamin D 400-800 IU/day  7. All other preventative health recommendations will be managed by the patients Primary care physician. PLAN:  Return in about 1 year (around 9/7/2023) for annual.  Pap smear collected  Lab slip given  Repeat Annual every 1 year  Cervical Cytology Evaluation begins at 24years old. If Negative Cytology, Follow-up screening per current guidelines. Mammograms every 1 year. If 35 yo and last mammogram was negative. Calcium and Vitamin D dosing reviewed. Colonoscopy screening reviewed as well as onset for bone density testing. Birth control and barrier recommendations discussed. STD counseling and prevention reviewed. Gardisil counseling completed for all patients 10-35 yo. Routine health maintenance per patients PCP. Orders Placed This Encounter   Procedures    PAP SMEAR     Patient History:    No LMP recorded.   OBGYN Status: Having periods  Past Surgical History:  12/2018: KNEE SURGERY  Medications/Contraceptives Affecting Cytology     Combination Contraceptives - Oral Disp Start End     Norgestim-Eth Estrad Triphasic 0.18/0.215/0.25 MG-35 MCG TABS    28 tablet 7/27/2021     Sig: Take 1 Package by mouth daily    Route: Oral       Social History    Tobacco Use      Smoking status: Some Days      Smokeless tobacco: Never      Tobacco comments: socially       Standing Status: Date:   9/7/2023     Order Specific Question:   Factor V Specimen     Answer:   BLOOD    Homocysteine     Standing Status:   Future     Standing Expiration Date:   3/7/2023    Lupus Anticoagulant     Standing Status:   Future     Standing Expiration Date:   9/7/2023    MTHFR Mutation 677T/[id number]     Standing Status:   Future     Standing Expiration Date:   9/7/2023    Protein C Functional     Standing Status:   Future     Standing Expiration Date:   10/7/2022    Protein C Antigen, Total     Standing Status:   Future     Standing Expiration Date:   3/7/2023    Protein S Antigen, Free     Standing Status:   Future     Standing Expiration Date:   10/7/2022    Protein S Activity     Standing Status:   Future     Standing Expiration Date:   10/7/2022    Prothrombin 70199 Mutation Invader     Standing Status:   Future     Standing Expiration Date:   9/7/2023    T. pallidum Ab     Standing Status:   Future     Standing Expiration Date:   10/7/2022           The patient, Jass Collins is a 24 y.o. female, was seen with a total time spent of 30 minutes for the visit on this date of service by the E/M provider. The time component had both face to face and non face to face time spent in determining the total time component. Counseling and education regarding her diagnosis listed below and her options regarding those diagnoses were also included in determining her time component. Diagnosis Orders   1. Well female exam with routine gynecological exam  PAP SMEAR      2. Family history of PCOS  Follicle Stimulating Hormone    Luteinizing Hormone    Glucose, Fasting    Hemoglobin A1C    Insulin, total    ALT    AST    BUN & Creatinine    Glucose, Random      3.  Family history of MTHFR deficiency  LEX    Sjogrens syndrome-A extractable nuclear antibody    Sjogrens syndrome-B extractable nuclear antibody    Beta-2 Glycoprotein Antibodies    Antithrombin 3 Activity    Antithrombin 3 Antigen    Factor 5 Leiden Homocysteine    Lupus Anticoagulant    MTHFR Mutation 677T/[id number]    Protein C Functional    Protein C Antigen, Total    Protein S Antigen, Free    Protein S Activity    Prothrombin 35596 Mutation Invader    T. pallidum Ab           The patient had her preventative health maintenance recommendations and follow-up reviewed with her at the completion of her visit.

## 2022-09-12 DIAGNOSIS — Z01.419 WELL FEMALE EXAM WITH ROUTINE GYNECOLOGICAL EXAM: ICD-10-CM

## 2022-09-16 ENCOUNTER — HOSPITAL ENCOUNTER (OUTPATIENT)
Age: 22
Discharge: HOME | End: 2022-09-16
Payer: COMMERCIAL

## 2022-09-16 DIAGNOSIS — Z83.49 FAMILY HISTORY OF MTHFR DEFICIENCY: ICD-10-CM

## 2022-09-16 DIAGNOSIS — Z84.2 FAMILY HISTORY OF PCOS: ICD-10-CM

## 2022-09-16 LAB
ALT SERPL-CCNC: 13 U/L (ref 5–33)
AST SERPL-CCNC: 22 U/L
BUN BLDV-MCNC: 10 MG/DL (ref 6–20)
CREAT SERPL-MCNC: 0.55 MG/DL (ref 0.5–0.9)
ESTIMATED AVERAGE GLUCOSE: 94 MG/DL
FOLLICLE STIMULATING HORMONE: 3.4 MIU/ML (ref 1.7–21.5)
GFR AFRICAN AMERICAN: >60 ML/MIN
GFR NON-AFRICAN AMERICAN: >60 ML/MIN
GFR SERPL CREATININE-BSD FRML MDRD: NORMAL ML/MIN/{1.73_M2}
GLUCOSE BLD-MCNC: 87 MG/DL (ref 70–99)
HBA1C MFR BLD: 4.9 % (ref 4–6)
HOMOCYSTEINE: 6.6 UMOL/L
LH: 9.6 MIU/ML (ref 1–95.6)
T. PALLIDUM, IGG: NONREACTIVE

## 2022-09-16 PROCEDURE — 86146 BETA-2 GLYCOPROTEIN ANTIBODY: CPT

## 2022-09-16 PROCEDURE — 85613 RUSSELL VIPER VENOM DILUTED: CPT

## 2022-09-16 PROCEDURE — 83002 ASSAY OF GONADOTROPIN (LH): CPT

## 2022-09-16 PROCEDURE — 86235 NUCLEAR ANTIGEN ANTIBODY: CPT

## 2022-09-16 PROCEDURE — 85610 PROTHROMBIN TIME: CPT

## 2022-09-16 PROCEDURE — 81240 F2 GENE: CPT

## 2022-09-16 PROCEDURE — 85301 ANTITHROMBIN III ANTIGEN: CPT

## 2022-09-16 PROCEDURE — 86038 ANTINUCLEAR ANTIBODIES: CPT

## 2022-09-16 PROCEDURE — 36415 COLL VENOUS BLD VENIPUNCTURE: CPT

## 2022-09-16 PROCEDURE — 85730 THROMBOPLASTIN TIME PARTIAL: CPT

## 2022-09-16 PROCEDURE — 85306 CLOT INHIBIT PROT S FREE: CPT

## 2022-09-16 PROCEDURE — 82947 ASSAY GLUCOSE BLOOD QUANT: CPT

## 2022-09-16 PROCEDURE — 81291 MTHFR GENE: CPT

## 2022-09-16 PROCEDURE — 83090 ASSAY OF HOMOCYSTEINE: CPT

## 2022-09-16 PROCEDURE — 82565 ASSAY OF CREATININE: CPT

## 2022-09-16 PROCEDURE — 83001 ASSAY OF GONADOTROPIN (FSH): CPT

## 2022-09-16 PROCEDURE — 84520 ASSAY OF UREA NITROGEN: CPT

## 2022-09-16 PROCEDURE — 86780 TREPONEMA PALLIDUM: CPT

## 2022-09-16 PROCEDURE — 84460 ALANINE AMINO (ALT) (SGPT): CPT

## 2022-09-16 PROCEDURE — 86147 CARDIOLIPIN ANTIBODY EA IG: CPT

## 2022-09-16 PROCEDURE — 84450 TRANSFERASE (AST) (SGOT): CPT

## 2022-09-16 PROCEDURE — 81241 F5 GENE: CPT

## 2022-09-16 PROCEDURE — 83036 HEMOGLOBIN GLYCOSYLATED A1C: CPT

## 2022-09-16 PROCEDURE — 85302 CLOT INHIBIT PROT C ANTIGEN: CPT

## 2022-09-16 PROCEDURE — 86225 DNA ANTIBODY NATIVE: CPT

## 2022-09-16 PROCEDURE — 85300 ANTITHROMBIN III ACTIVITY: CPT

## 2022-09-16 PROCEDURE — 85303 CLOT INHIBIT PROT C ACTIVITY: CPT

## 2022-09-17 ENCOUNTER — HOSPITAL ENCOUNTER (OUTPATIENT)
Age: 22
Discharge: HOME | End: 2022-09-17
Payer: COMMERCIAL

## 2022-09-17 DIAGNOSIS — Z84.2 FAMILY HISTORY OF PCOS: ICD-10-CM

## 2022-09-17 LAB
GLUCOSE FASTING: 91 MG/DL (ref 70–99)
INSULIN COMMENT: NORMAL
INSULIN REFERENCE RANGE:: NORMAL
INSULIN: 12.3 MU/L

## 2022-09-17 PROCEDURE — 83525 ASSAY OF INSULIN: CPT

## 2022-09-17 PROCEDURE — 36415 COLL VENOUS BLD VENIPUNCTURE: CPT

## 2022-09-17 PROCEDURE — 82947 ASSAY GLUCOSE BLOOD QUANT: CPT

## 2022-09-19 ENCOUNTER — TELEPHONE (OUTPATIENT)
Dept: OBGYN CLINIC | Age: 22
End: 2022-09-19

## 2022-09-19 PROBLEM — E28.2 PCOS (POLYCYSTIC OVARIAN SYNDROME): Status: ACTIVE | Noted: 2022-09-19

## 2022-09-19 LAB
ANTI DNA DOUBLE STRANDED: 1 IU/ML
ANTI-NUCLEAR ANTIBODY (ANA): NEGATIVE
BETA 2 GLYCOPROT.1 IGA AB: 1.8 ELISA U/ML (ref 0–7)
BETA 2 GLYCOPROT.1 IGG AB: 1.1 ELISA U/ML (ref 0–7)
BETA 2 GLYCOPROT.1 IGM AB: <0.9 ELISA U/ML (ref 0–7)
ENA ANTIBODIES SCREEN: 0.2 U/ML

## 2022-09-19 RX ORDER — METFORMIN HYDROCHLORIDE 500 MG/1
TABLET, EXTENDED RELEASE ORAL
Qty: 49 TABLET | Refills: 2 | Status: SHIPPED | OUTPATIENT
Start: 2022-09-19 | End: 2022-10-31

## 2022-09-19 NOTE — TELEPHONE ENCOUNTER
Patient notified of results and recommendations, script for metformin to be sent to [facility] on [address].

## 2022-09-20 LAB
ANTI SSA: <0.3 U/ML
ANTI SSB: <0.3 U/ML
MTHFR INTERPRETATION: NORMAL
MTHFR MUTATION A1286C: NORMAL
MTHFR MUTATION C665T: NEGATIVE
SPECIMEN: NORMAL

## 2022-09-21 LAB
ANTI-THROMBIN 3 AG: 85 % (ref 82–136)
FACTOR V MUTATION: NEGATIVE
PROTEIN C ANTIGEN: 77 % (ref 63–153)
PROTHROMBIN G20210A MUTATION: NEGATIVE
PT PCR SPECIMEN: NORMAL
SPECIMEN: NORMAL

## 2022-09-22 LAB — PROTEIN S ANTIGEN, FREE: 92 % (ref 55–123)

## 2022-09-27 LAB
ANTICARDIOLIPIN IGA ANTIBODY: 6.8 APL (ref 0–14)
ANTICARDIOLIPIN IGG ANTIBODY: 0.9 GPL (ref 0–10)
AT-III ACTIVITY: 92 % (ref 83–122)
CARDIOLIPIN AB IGM: 8.2 MPL (ref 0–10)
DILUTE RUSSELL VIPER VENOM TIME: NORMAL
INR BLD: 1
PARTIAL THROMBOPLASTIN TIME: 32.5 SEC (ref 24–36)
PROTEIN C ACTIVITY: 117 %
PROTHROMBIN TIME: 12.7 SEC (ref 11.8–14.6)

## 2022-09-30 LAB — PROTEIN S ACTIVITY: 130 % (ref 59–130)

## 2022-10-05 ENCOUNTER — HOSPITAL ENCOUNTER (OUTPATIENT)
Age: 22
Setting detail: SPECIMEN
Discharge: HOME | End: 2022-10-05

## 2022-10-05 DIAGNOSIS — N92.6 MISSED MENSES: ICD-10-CM

## 2022-10-05 DIAGNOSIS — N92.6 MISSED MENSES: Primary | ICD-10-CM

## 2022-10-05 LAB — HCG QUANTITATIVE: 489 MIU/ML

## 2022-10-06 DIAGNOSIS — N92.6 MISSED MENSES: Primary | ICD-10-CM

## 2022-10-06 RX ORDER — PNV NO.95/FERROUS FUM/FOLIC AC 28MG-0.8MG
1 TABLET ORAL DAILY
Qty: 30 TABLET | Refills: 12 | Status: SHIPPED | OUTPATIENT
Start: 2022-10-06

## 2022-10-06 NOTE — TELEPHONE ENCOUNTER
Patient notified of lab results and to repeat labs in 48 hours and again in 1 week for trending. LMP 9/3/22, prenatals to [facility] [address]    Patient also needs script for foltx, patient recently positive MTHFR.

## 2022-10-06 NOTE — TELEPHONE ENCOUNTER
----- Message from RIAZ Li NP sent at 10/6/2022  4:09 PM EDT -----  + quant  Repeat quant in 48 hours and again in 1 week for trending  Determine LMP  Order PNVs  Patient will need scheduled for NOB

## 2022-10-08 ENCOUNTER — HOSPITAL ENCOUNTER (OUTPATIENT)
Age: 22
Discharge: HOME | End: 2022-10-08
Payer: COMMERCIAL

## 2022-10-08 DIAGNOSIS — N92.6 MISSED MENSES: ICD-10-CM

## 2022-10-08 LAB — HCG QUANTITATIVE: 1713 MIU/ML

## 2022-10-08 PROCEDURE — 84702 CHORIONIC GONADOTROPIN TEST: CPT

## 2022-10-08 PROCEDURE — 36415 COLL VENOUS BLD VENIPUNCTURE: CPT

## 2022-10-10 ENCOUNTER — TELEPHONE (OUTPATIENT)
Dept: OBGYN CLINIC | Age: 22
End: 2022-10-10

## 2022-10-10 PROBLEM — Z15.89 MTHFR GENE MUTATION: Status: ACTIVE | Noted: 2022-10-10

## 2022-10-10 NOTE — TELEPHONE ENCOUNTER
Per Chintan Hester NP, pt notified of +quant; LMP 9/3/22. Pt previously was sent script for PNV and folgard. Call sent to [facility] to schedule new OB intake/US. Pt verbalized understanding.

## 2022-10-10 NOTE — TELEPHONE ENCOUNTER
----- Message from RIAZ Pal CNP sent at 10/10/2022  8:09 AM EDT -----  Please schedule for new OB intake/ ultrasound  Prenatal vitamin 1 PO qD with 12 refills. +MTHFR  Folgard 1 tab po QD with 12 refills.

## 2022-10-17 ENCOUNTER — HOSPITAL ENCOUNTER (OUTPATIENT)
Age: 22
Discharge: HOME | End: 2022-10-17
Payer: COMMERCIAL

## 2022-10-17 DIAGNOSIS — N92.6 MISSED PERIOD: Primary | ICD-10-CM

## 2022-10-17 DIAGNOSIS — N92.6 MISSED PERIOD: ICD-10-CM

## 2022-10-17 LAB — HCG QUANTITATIVE: ABNORMAL MIU/ML

## 2022-10-17 PROCEDURE — 36415 COLL VENOUS BLD VENIPUNCTURE: CPT

## 2022-10-17 PROCEDURE — 84702 CHORIONIC GONADOTROPIN TEST: CPT

## 2022-10-18 ENCOUNTER — TELEPHONE (OUTPATIENT)
Dept: OBGYN CLINIC | Age: 22
End: 2022-10-18

## 2022-10-18 NOTE — TELEPHONE ENCOUNTER
----- Message from RIAZ Kwon CNP sent at 10/18/2022  7:48 AM EDT -----  Increasing quant HCG level  Prenatal vitamin 1 PO qD with 12 refills. MTHFR- folgard 1 tab po qd with 12 refills.   Please schedule for new ob intake/ ultrasound

## 2022-10-18 NOTE — TELEPHONE ENCOUNTER
Per Lamberto Miller pt notified of quant results. Pt previously scheduled for OB intake/US results. Foltx and PNV previously sent and pt instructed to take as directed.

## 2022-10-21 ENCOUNTER — TELEPHONE (OUTPATIENT)
Dept: OBGYN CLINIC | Age: 22
End: 2022-10-21

## 2022-10-21 RX ORDER — ONDANSETRON 4 MG/1
4 TABLET, FILM COATED ORAL EVERY 8 HOURS PRN
Qty: 15 TABLET | Refills: 0 | Status: SHIPPED | OUTPATIENT
Start: 2022-10-21

## 2022-10-27 DIAGNOSIS — Z34.90 EARLY STAGE OF PREGNANCY: Primary | ICD-10-CM

## 2022-10-31 ENCOUNTER — INITIAL PRENATAL (OUTPATIENT)
Dept: OBGYN CLINIC | Age: 22
End: 2022-10-31

## 2022-10-31 ENCOUNTER — PROCEDURE VISIT (OUTPATIENT)
Dept: OBGYN CLINIC | Age: 22
End: 2022-10-31
Payer: COMMERCIAL

## 2022-10-31 VITALS
HEART RATE: 82 BPM | DIASTOLIC BLOOD PRESSURE: 78 MMHG | BODY MASS INDEX: 37.44 KG/M2 | WEIGHT: 225 LBS | SYSTOLIC BLOOD PRESSURE: 122 MMHG

## 2022-10-31 DIAGNOSIS — Z15.89 MTHFR GENE MUTATION: Primary | ICD-10-CM

## 2022-10-31 DIAGNOSIS — R00.0 TACHYCARDIA: ICD-10-CM

## 2022-10-31 DIAGNOSIS — Z3A.01 7 WEEKS GESTATION OF PREGNANCY: ICD-10-CM

## 2022-10-31 DIAGNOSIS — Z34.90 EARLY STAGE OF PREGNANCY: ICD-10-CM

## 2022-10-31 DIAGNOSIS — Z34.90 EARLY STAGE OF PREGNANCY: Primary | ICD-10-CM

## 2022-10-31 DIAGNOSIS — O09.91 SUPERVISION OF HIGH RISK PREGNANCY IN FIRST TRIMESTER: ICD-10-CM

## 2022-10-31 LAB
CRL: NORMAL
SAC DIAMETER: NORMAL

## 2022-10-31 PROCEDURE — 99999 PR OFFICE/OUTPT VISIT,PROCEDURE ONLY: CPT | Performed by: NURSE PRACTITIONER

## 2022-10-31 PROCEDURE — 76801 OB US < 14 WKS SINGLE FETUS: CPT | Performed by: OBSTETRICS & GYNECOLOGY

## 2022-10-31 RX ORDER — VITAMIN A ACETATE, .BETA.-CAROTENE, ASCORBIC ACID, CHOLECALCIFEROL, .ALPHA.-TOCOPHEROL ACETATE, DL-, THIAMINE MONONITRATE, RIBOFLAVIN, NIACINAMIDE, PYRIDOXINE HYDROCHLORIDE, FOLIC ACID, CYANOCOBALAMIN, CALCIUM CARBONATE, FERROUS FUMARATE, ZINC OXIDE, AND CUPRIC OXIDE 2000; 2000; 120; 400; 22; 1.84; 3; 20; 10; 1; 12; 200; 27; 25; 2 [IU]/1; [IU]/1; MG/1; [IU]/1; MG/1; MG/1; MG/1; MG/1; MG/1; MG/1; UG/1; MG/1; MG/1; MG/1; MG/1
TABLET ORAL
COMMUNITY
Start: 2022-10-17 | End: 2022-10-31

## 2022-10-31 NOTE — PROGRESS NOTES
Patient presents to office for OB intake, nurse visit only. Intake completed following ultrasound in office to confirm pregnancy dating/viability. Based on ultrasound, patient is currently 7w5d  gestation, Estimated Date of Delivery: 6/14/23. Patient presents to intake FOB. This was a planned pregnancy. Patient currently taking has a current medication list which includes the following prescription(s): ondansetron, folic acid-pyridoxine-cyanocobalamine, and prenatal vitamins. . Patient's medical, surgical, obstetrical and family history reviewed. See EHR for details. Patient prenatal lab work given to patient at this visit as well as OB education material. New OB consent forms signed. Patient accepted first trimester screening. Cystic Fibrosis screening declined. Referral placed to [facility] for first trimester screen, hx of tachycardia and MTHFR. Pt referred to contact her cardiologist re: tachycardia; she verbalized understanding. Patient scheduled for follow up OB appointment with Maynor Ramirez NP on 11/16/22. Patient instructed to complete lab work prior to follow up OB appointment.

## 2022-11-01 NOTE — RESULT ENCOUNTER NOTE
Pt referral for MFM 1st tri screen previously sent and pt currently taking Foltex for MTHFR and PNV.

## 2022-11-11 ENCOUNTER — HOSPITAL ENCOUNTER (OUTPATIENT)
Age: 22
Discharge: HOME | End: 2022-11-11
Payer: COMMERCIAL

## 2022-11-11 DIAGNOSIS — Z34.90 EARLY STAGE OF PREGNANCY: ICD-10-CM

## 2022-11-11 DIAGNOSIS — Z3A.01 7 WEEKS GESTATION OF PREGNANCY: ICD-10-CM

## 2022-11-11 LAB
ABO/RH: NORMAL
ABSOLUTE EOS #: 0 K/UL (ref 0–0.4)
ABSOLUTE LYMPH #: 2.3 K/UL (ref 1–4.8)
ABSOLUTE MONO #: 0.5 K/UL (ref 0.1–1.3)
ANTIBODY SCREEN: NEGATIVE
BASOPHILS # BLD: 0 % (ref 0–2)
BASOPHILS ABSOLUTE: 0 K/UL (ref 0–0.2)
EOSINOPHILS RELATIVE PERCENT: 0 % (ref 0–4)
GLUCOSE BLD-MCNC: 91 MG/DL (ref 70–99)
HCT VFR BLD CALC: 37.6 % (ref 36–46)
HEMOGLOBIN: 12.2 G/DL (ref 12–16)
HEPATITIS B SURFACE ANTIGEN: NONREACTIVE
LYMPHOCYTES # BLD: 22 % (ref 24–44)
MCH RBC QN AUTO: 28.3 PG (ref 26–34)
MCHC RBC AUTO-ENTMCNC: 32.5 G/DL (ref 31–37)
MCV RBC AUTO: 87 FL (ref 80–100)
MONOCYTES # BLD: 5 % (ref 1–7)
PDW BLD-RTO: 14.6 % (ref 11.5–14.9)
PLATELET # BLD: 250 K/UL (ref 150–450)
PMV BLD AUTO: 9.4 FL (ref 6–12)
RBC # BLD: 4.32 M/UL (ref 4–5.2)
RUBV IGG SER QL: 11.7 IU/ML
SEG NEUTROPHILS: 73 % (ref 36–66)
SEGMENTED NEUTROPHILS ABSOLUTE COUNT: 7.8 K/UL (ref 1.3–9.1)
T. PALLIDUM, IGG: NONREACTIVE
TOXOPLASM IGM: 0.38 INDEX
TOXOPLASMA BLOOD FOR RATIO: <0.5 IU/ML
TSH SERPL DL<=0.05 MIU/L-ACNC: 0.65 UIU/ML (ref 0.3–5)
WBC # BLD: 10.7 K/UL (ref 3.5–11)

## 2022-11-11 PROCEDURE — 86778 TOXOPLASMA ANTIBODY IGM: CPT

## 2022-11-11 PROCEDURE — 86900 BLOOD TYPING SEROLOGIC ABO: CPT

## 2022-11-11 PROCEDURE — 87340 HEPATITIS B SURFACE AG IA: CPT

## 2022-11-11 PROCEDURE — 86901 BLOOD TYPING SEROLOGIC RH(D): CPT

## 2022-11-11 PROCEDURE — 86777 TOXOPLASMA ANTIBODY: CPT

## 2022-11-11 PROCEDURE — 86850 RBC ANTIBODY SCREEN: CPT

## 2022-11-11 PROCEDURE — 82947 ASSAY GLUCOSE BLOOD QUANT: CPT

## 2022-11-11 PROCEDURE — 86780 TREPONEMA PALLIDUM: CPT

## 2022-11-11 PROCEDURE — 36415 COLL VENOUS BLD VENIPUNCTURE: CPT

## 2022-11-11 PROCEDURE — 84443 ASSAY THYROID STIM HORMONE: CPT

## 2022-11-11 PROCEDURE — 85025 COMPLETE CBC W/AUTO DIFF WBC: CPT

## 2022-11-11 PROCEDURE — 86762 RUBELLA ANTIBODY: CPT

## 2022-11-16 ENCOUNTER — HOSPITAL ENCOUNTER (OUTPATIENT)
Age: 22
Setting detail: SPECIMEN
Discharge: HOME | End: 2022-11-16

## 2022-11-16 ENCOUNTER — ROUTINE PRENATAL (OUTPATIENT)
Dept: OBGYN CLINIC | Age: 22
End: 2022-11-16

## 2022-11-16 VITALS
HEART RATE: 80 BPM | WEIGHT: 225 LBS | BODY MASS INDEX: 37.44 KG/M2 | DIASTOLIC BLOOD PRESSURE: 72 MMHG | SYSTOLIC BLOOD PRESSURE: 118 MMHG

## 2022-11-16 DIAGNOSIS — Z15.89 MTHFR GENE MUTATION: Primary | ICD-10-CM

## 2022-11-16 DIAGNOSIS — Z3A.10 10 WEEKS GESTATION OF PREGNANCY: ICD-10-CM

## 2022-11-16 DIAGNOSIS — Z34.90 SECOND PREGNANCY: ICD-10-CM

## 2022-11-16 DIAGNOSIS — Z87.59 HX OF ONE MISCARRIAGE: ICD-10-CM

## 2022-11-16 PROCEDURE — 0500F INITIAL PRENATAL CARE VISIT: CPT | Performed by: NURSE PRACTITIONER

## 2022-11-16 NOTE — PROGRESS NOTES
Ari Dougherty  2022    YOB: 2023   Patient's last menstrual period was 2022.  10w0d        Primary Care Physician: RIAZ Doss CNP        CC: Initial Prenatal Visit    Subjective:     Ari Dougherty is a 20 y.o. female [place]    is being seen today for her first obstetrical visit. This is not a planned pregnancy. She is at 10w0d  Her obstetrical history is significant for second pregnancy, MTHFR, hx SAB   Relationship with FOB: significant other, living together. Patient does intend to breast feed. Pregnancy history fully reviewed. Mother's ethnicity:   Father's ethnicity:       Objective:   Blood pressure 118/72, pulse 80, weight 225 lb (102.1 kg), last menstrual period 2022, not currently breastfeeding.     OB History    Para Term  AB Living   2 0 0 0 1 0   SAB IAB Ectopic Molar Multiple Live Births   1 0 0 0 0 0      # Outcome Date GA Lbr Khris/2nd Weight Sex Delivery Anes PTL Lv   2 Current            1 SAB 2022               Past Medical History:   Diagnosis Date    Chlamydia contact, treated 2020    MTHFR gene mutation     Tachycardia      Past Surgical History:   Procedure Laterality Date    KNEE SURGERY  2018      Social History     Socioeconomic History    Marital status: Single     Spouse name: Not on file    Number of children: Not on file    Years of education: Not on file    Highest education level: Not on file   Occupational History    Not on file   Tobacco Use    Smoking status: Former     Types: Cigarettes    Smokeless tobacco: Never    Tobacco comments:     socially   Vaping Use    Vaping Use: Never used   Substance and Sexual Activity    Alcohol use: Not Currently    Drug use: Never    Sexual activity: Yes     Partners: Male   Other Topics Concern    Not on file   Social History Narrative    Not on file     Social Determinants of Health     Financial Resource Strain: Not on file   Food Insecurity: Not on file   Transportation Needs: Not on file   Physical Activity: Not on file   Stress: Not on file   Social Connections: Not on file   Intimate Partner Violence: Not on file   Housing Stability: Not on file     Family History   Problem Relation Age of Onset    Colon Cancer Paternal Grandmother     No Known Problems Father     Other Mother         twins; only 1 delivered    No Known Problems Brother     Hypertension Sister     Depression Sister        MEDICATIONS:  Current Outpatient Medications   Medication Sig Dispense Refill    folic acid-pyridoxine-cyanocobalamine (FOLTX) 2.5-25-1 MG TABS tablet Take 1 tablet by mouth daily 30 tablet 12    Prenatal Vit-Fe Fumarate-FA (PRENATAL VITAMINS) 28-0.8 MG TABS Take 1 tablet by mouth daily 30 tablet 12    ondansetron (ZOFRAN) 4 MG tablet Take 1 tablet by mouth every 8 hours as needed for Nausea (Patient not taking: Reported on 11/16/2022) 15 tablet 0     No current facility-administered medications for this visit. ALLERGIES:  Allergies as of 11/16/2022    (No Known Allergies)           Physical Exam Completed-See Epic Navigator    Chaperone for Intimate Exam  Chaperone was offered and accepted as part of the rooming process. Chaperone: Sahra Reich MA                 Assessment:      Pregnancy at 10 and 0/7 weeks    Diagnosis Orders   1. 10 weeks gestation of pregnancy                Patient Active Problem List    Diagnosis Date Noted    MTHFR gene mutation 10/10/2022     Priority: High    Tachycardia 10/31/2022     Priority: Medium    PCOS (polycystic ovarian syndrome) 09/19/2022     Priority: Medium                    Plan:      Initial labs drawn  Vaginal cultures collected  Referred to [facility] for first trimester screen   Prenatal vitamins. Problem list reviewed and updated.   NT Screen/Quad Testing and MSAFP Single Marker: requested  Role of ultrasound in pregnancy discussed; fetal survey: requests  Amniocentesis discussed: Not indicated  NIPT Testing not indicated  Cultures & Wet Prep Collected  Follow-up in 4 weeks  Repeat Annual every 1 year  Cervical Cytology Evaluation begins at 24years old. If Negative Cytology, Follow-up screening per current guidelines. [facility] appointment scheduled      COUNSELING COMPLETED:  INITIAL OBSTETRICAL VISIT EVALUATION:  The patient was seen full history and physical was completed/reviewed. Cytology was collected for patients over 24years of age. Cultures were collected. The patient was counseled on office policies and she was counseled on termination of pregnancy in the state of [place]. The patient was counseled on Toxoplasmosis, HIV, Tobacco Abuse, Group Beta Strep Infections, Cystic Fibrosis,  Labor precautions and Sickle Cell disease. The patient was counseled on the risks of tobacco abuse. Both maternal and fetal. She was instructed to stop smoking if currently using tobacco. Morbidity, mortality, and cessation programs were reviewed. The risks include but are not limited to increased risks of  labor,  delivery, premature rupture of membranes, intrauterine growth restriction, intrauterine fetal demise and abruptio placenta. Secondary smoke risks were also reviewed. Increases in cancer, respiratory problems, and sudden infant death syndrome were reviewed as well. The patient was informed of a 2-4% risk of congenital anomalies in the general population. She was also informed that karyotyping is the only way to evaluate the fetus for genetic problems and genetic lethal anomalies. Chorionic villous sampling, amniocentesis and NIPT testing were also discussed with morbidity rates in detail. She requested the procedure. Route of delivery and counseling on vaginal, operative vaginal, and  sections were completed with the risks of each to both the patient as well as her baby. The possibility of a blood transfusion was discussed as well.  The patient was not opposed to receiving a transfusion if needed. Nuchal translucency/Quad Evaluation and MSAFP single marker testing was reviewed in detail with attention to timing of testing and their windows. For patients beyond the gestational age for Nuchal translucency evaluation Quad testing was recommended. Timing for the Quad test was reviewed. Benefits of the above testing was reviewed. A second trimester amniocentesis was also made available to the patient. Risks, Benefits and non-invasive alternative testing was reviewed. The literature regarding a questionable link to pitocin augmentation and induction of labor, the assistance of labor contractions and the initiation of contractions to help delivery, have been reviewed with the patient regarding the increased potential of having a  with Attention Deficit Hyperactivity Disorder and or Autism. These two disorders and the ramifications of their impact on a child and the family caring for that child has been reviewed with the patient in detail. She was given the risks, benefits and alternatives of the use of this medication. She has agreed to its use in the delivery of her unborn child if needed at the time of delivery, Yes. The patient was questioned in detail regarding any genetic misnomer history, chromosomal abnormalities, or learning disabilities in  herself, the father of the baby or their families. SHE DENIED ANY HISTORY AS STATED ABOVE: Yes    Upon completion of the visit all questions were answered and the patients follow-up and testing schedule were reviewed. Prenatal vitamins were given. T-dap Vaccine recommendations reviewed with the patient. Patient notified of timing of vaccination 27-36 weeks gestation. Patient aware Vaccine is NOT Live. Yes. The patient, Mart Spatz is a 20 y.o. female, was seen with a total time spent of 30 minutes for the visit on this date of service by the E/M provider.  The time component had both face to face and non face to face time spent in determining the total time component. Counseling and education regarding her diagnosis listed below and her options regarding those diagnoses were also included in determining her time component. Diagnosis Orders   1. 10 weeks gestation of pregnancy             The patient had her preventative health maintenance recommendations and follow-up reviewed with her at the completion of her visit.

## 2022-11-17 DIAGNOSIS — Z3A.10 10 WEEKS GESTATION OF PREGNANCY: ICD-10-CM

## 2022-11-20 LAB
CULTURE: NORMAL
SPECIMEN DESCRIPTION: NORMAL

## 2022-12-09 ENCOUNTER — HOSPITAL ENCOUNTER (OUTPATIENT)
Age: 22
Discharge: HOME | End: 2022-12-09
Payer: COMMERCIAL

## 2022-12-09 ENCOUNTER — ROUTINE PRENATAL (OUTPATIENT)
Dept: PERINATAL CARE | Age: 22
End: 2022-12-09
Payer: COMMERCIAL

## 2022-12-09 VITALS
DIASTOLIC BLOOD PRESSURE: 74 MMHG | BODY MASS INDEX: 37.91 KG/M2 | HEART RATE: 105 BPM | TEMPERATURE: 97.4 F | WEIGHT: 227.51 LBS | RESPIRATION RATE: 16 BRPM | HEIGHT: 65 IN | SYSTOLIC BLOOD PRESSURE: 128 MMHG

## 2022-12-09 DIAGNOSIS — Z3A.13 13 WEEKS GESTATION OF PREGNANCY: ICD-10-CM

## 2022-12-09 DIAGNOSIS — Z36.9 FIRST TRIMESTER SCREENING: Primary | ICD-10-CM

## 2022-12-09 DIAGNOSIS — O99.211 OBESITY AFFECTING PREGNANCY IN FIRST TRIMESTER: ICD-10-CM

## 2022-12-09 DIAGNOSIS — O36.80X0 ENCOUNTER TO DETERMINE FETAL VIABILITY OF PREGNANCY, SINGLE OR UNSPECIFIED FETUS: ICD-10-CM

## 2022-12-09 PROCEDURE — 81508 FTL CGEN ABNOR TWO PROTEINS: CPT

## 2022-12-09 PROCEDURE — 76813 OB US NUCHAL MEAS 1 GEST: CPT | Performed by: OBSTETRICS & GYNECOLOGY

## 2022-12-09 PROCEDURE — 36415 COLL VENOUS BLD VENIPUNCTURE: CPT

## 2022-12-11 LAB
AFP INTERPRETATION: NORMAL
AFP SPECIMEN: NORMAL
CRL: 77 MM
CROWN RUMP LENGTH TWIN B: NORMAL MM
CROWN RUMP LENGTH: NORMAL
DATE OF BIRTH: NORMAL
DONOR EGG?: NO
GESTATIONAL AGE: NORMAL
HISTORY OF ANEUPLOIDY?: NO
IN VITRO FERTILIZATION: NO
MATERNAL AGE AT EDD: 23.6 YR
MATERNAL SCREEN, EER: NORMAL
MATERNAL WEIGHT: 227
MOM FOR NT, TWIN B: NORMAL
MOM FOR NT: 1.11
MOM FOR PAPP-A: 1.18
MONOCHORIONIC TWINS: NORMAL
MSHCG-MOM: 1.58
MSHCG: NORMAL IU/L
NUCHAL TRANSLUC (NT): 2.1 MM
NUCHAL TRANSLUC (NT): NORMAL
NUCHAL TRANSLUCENCY TWIN B: NORMAL MM
NUMBER OF FETUSES: 1
NUMBER OF FETUSES: NORMAL
PAPP-A MOM: 827.3 NG/ML
PATIENT WEIGHT UNITS: NORMAL
PATIENT WEIGHT: NORMAL
PREV TRISOMY PREG: NO
RACE (MATERNAL): NORMAL
RACE: NORMAL
READING MD CERT NUM: NORMAL
READING MD NAME: NORMAL
REPEAT SPECIMEN?: NO
SMOKING: NO
SMOKING: NO
SONOGRAPHER CERT NO: NORMAL
SONOGRAPHER CERT NO: NORMAL
SONOGRAPHER NAME: NORMAL
SONOGRAPHER NAME: NORMAL
ULTRASOUND DATE: NORMAL
ULTRASOUND DATE: NORMAL

## 2022-12-15 ENCOUNTER — ROUTINE PRENATAL (OUTPATIENT)
Dept: OBGYN CLINIC | Age: 22
End: 2022-12-15

## 2022-12-15 VITALS
DIASTOLIC BLOOD PRESSURE: 70 MMHG | HEIGHT: 65 IN | SYSTOLIC BLOOD PRESSURE: 120 MMHG | HEART RATE: 119 BPM | BODY MASS INDEX: 37.65 KG/M2 | WEIGHT: 226 LBS | OXYGEN SATURATION: 98 %

## 2022-12-15 DIAGNOSIS — Z34.90 SECOND PREGNANCY: ICD-10-CM

## 2022-12-15 DIAGNOSIS — Z87.59 HX OF ONE MISCARRIAGE: Primary | ICD-10-CM

## 2022-12-15 DIAGNOSIS — Z3A.14 14 WEEKS GESTATION OF PREGNANCY: ICD-10-CM

## 2022-12-15 PROCEDURE — 0502F SUBSEQUENT PRENATAL CARE: CPT | Performed by: NURSE PRACTITIONER

## 2022-12-15 RX ORDER — OSELTAMIVIR PHOSPHATE 75 MG/1
75 CAPSULE ORAL 2 TIMES DAILY
COMMUNITY
Start: 2022-12-14

## 2022-12-15 NOTE — PROGRESS NOTES
Sandeep Muller is a 20 y.o. female 14w5d        OB History    Para Term  AB Living   2 0 0 0 1 0   SAB IAB Ectopic Molar Multiple Live Births   1 0 0 0          # Outcome Date GA Lbr Khris/2nd Weight Sex Delivery Anes PTL Lv   2 Current            1 SAB 2022                     Vitals  BP: 120/70  Weight: 226 lb (102.5 kg)  Heart Rate: (!) 119      The patient was seen and evaluated. There was N/A fetal movements. No contractions or leakage of fluid. Signs and symptoms of  labor as well as labor were reviewed. The Nuchal Translucency testing was reviewed and found to be normal. A single marker MSAFP was ordered for a 15-20 week gestational age window. TOP ST OH Reviewed. Dates were reviewed with the patient. An 18-22 week anatomy ultrasound has been ordered. The patient will return to the office for her next visit in 4 weeks. See antepartum flow sheet. TDAP @ 27 weeks  Daily baby ASA 81mg PO for prevention of preeclampsia in pregnant women at high risk recommended by USPSTF/ACOG. Assessment:  1. Sandeep Muller is a 20 y.o. female  2.   3. 14w5d    Patient Active Problem List    Diagnosis Date Noted    BMI 37.0-37.9, adult 2022     Priority: High     2022 early 1 hour GTT      Hx of one miscarriage 2022     Priority: High    Second pregnancy 2022     Priority: High    MTHFR gene mutation 10/10/2022     Priority: High    Tachycardia 10/31/2022     Priority: Medium    PCOS (polycystic ovarian syndrome) 2022     Priority: Medium        Diagnosis Orders   1. Hx of one miscarriage        2. Second pregnancy        3. BMI 37.0-37.9, adult        4. 14 weeks gestation of pregnancy  Urinalysis with Reflex to Culture    HIV Screen    Glucose tolerance, 1 hour    Alpha Fetoprotein, Maternal            Plan:  Lab slips given        Patient was seen with total face to face time of 20 minutes.  More than 50% of this visit was on counseling and education regarding her    Diagnosis Orders   1. Hx of one miscarriage        2. Second pregnancy        3. BMI 37.0-37.9, adult        4. 14 weeks gestation of pregnancy  Urinalysis with Reflex to Culture    HIV Screen    Glucose tolerance, 1 hour    Alpha Fetoprotein, Maternal       and her options. She was also counseled on her preventative health maintenance recommendations and follow-up.

## 2023-01-05 ENCOUNTER — HOSPITAL ENCOUNTER (OUTPATIENT)
Age: 23
Discharge: HOME | End: 2023-01-05
Payer: COMMERCIAL

## 2023-01-05 DIAGNOSIS — Z3A.14 14 WEEKS GESTATION OF PREGNANCY: ICD-10-CM

## 2023-01-05 DIAGNOSIS — Z3A.10 10 WEEKS GESTATION OF PREGNANCY: ICD-10-CM

## 2023-01-05 LAB
BACTERIA: ABNORMAL
BILIRUBIN URINE: NEGATIVE
CASTS UA: ABNORMAL /LPF
COLOR: YELLOW
EPITHELIAL CELLS UA: ABNORMAL /HPF
GLUCOSE ADMINISTRATION: NORMAL
GLUCOSE TOLERANCE SCREEN 50G: 118 MG/DL (ref 70–135)
GLUCOSE URINE: NEGATIVE
KETONES, URINE: NEGATIVE
LEUKOCYTE ESTERASE, URINE: ABNORMAL
NITRITE, URINE: NEGATIVE
PH UA: 6.5 (ref 5–8)
PROTEIN UA: NEGATIVE
RBC UA: ABNORMAL /HPF
SPECIFIC GRAVITY UA: 1.02 (ref 1–1.03)
TURBIDITY: ABNORMAL
URINE HGB: NEGATIVE
UROBILINOGEN, URINE: NORMAL
WBC UA: ABNORMAL /HPF

## 2023-01-05 PROCEDURE — 81001 URINALYSIS AUTO W/SCOPE: CPT

## 2023-01-05 PROCEDURE — 36415 COLL VENOUS BLD VENIPUNCTURE: CPT

## 2023-01-05 PROCEDURE — 87389 HIV-1 AG W/HIV-1&-2 AB AG IA: CPT

## 2023-01-05 PROCEDURE — 82950 GLUCOSE TEST: CPT

## 2023-01-05 PROCEDURE — 87086 URINE CULTURE/COLONY COUNT: CPT

## 2023-01-05 PROCEDURE — 82105 ALPHA-FETOPROTEIN SERUM: CPT

## 2023-01-05 PROCEDURE — 86780 TREPONEMA PALLIDUM: CPT

## 2023-01-06 LAB
CULTURE: NORMAL
HIV AG/AB: NONREACTIVE
SPECIMEN DESCRIPTION: NORMAL
T. PALLIDUM, IGG: NONREACTIVE

## 2023-01-08 LAB
AFP INTERPRETATION: NORMAL
AFP MOM: 0.98
AFP SPECIMEN: NORMAL
AFP: 32 NG/ML
DATE OF BIRTH: NORMAL
DATING METHOD: NORMAL
DETERMINED BY: NORMAL
DIABETIC: NO
DONOR EGG?: NO
DUE DATE: NORMAL
ESTIMATED DUE DATE: NORMAL
FAMILY HISTORY NTD: NO
GESTATIONAL AGE: NORMAL
IN VITRO FERTILIZATION: NO
INSULIN REQ DIABETES: NO
LAST MENSTRUAL PERIOD: NORMAL
MATERNAL AGE AT EDD: 23.6 YR
MATERNAL WEIGHT: 225
MONOCHORIONIC TWINS: NO
NUMBER OF FETUSES: NORMAL
PATIENT WEIGHT UNITS: NORMAL
PATIENT WEIGHT: NORMAL
RACE (MATERNAL): NORMAL
RACE: NORMAL
REPEAT SPECIMEN?: NO
SMOKING: NO
SMOKING: NO
VALPROIC/CARBAMAZEP: NO
ZZ NTE CLEAN UP: HISTORY: NO

## 2023-01-10 ENCOUNTER — ROUTINE PRENATAL (OUTPATIENT)
Dept: OBGYN CLINIC | Age: 23
End: 2023-01-10

## 2023-01-10 VITALS
WEIGHT: 232 LBS | BODY MASS INDEX: 38.61 KG/M2 | SYSTOLIC BLOOD PRESSURE: 120 MMHG | HEART RATE: 100 BPM | DIASTOLIC BLOOD PRESSURE: 76 MMHG

## 2023-01-10 DIAGNOSIS — R00.0 TACHYCARDIA: ICD-10-CM

## 2023-01-10 DIAGNOSIS — Z3A.18 18 WEEKS GESTATION OF PREGNANCY: ICD-10-CM

## 2023-01-10 DIAGNOSIS — Z87.59 HX OF ONE MISCARRIAGE: ICD-10-CM

## 2023-01-10 DIAGNOSIS — O09.92 HIGH-RISK PREGNANCY IN SECOND TRIMESTER: Primary | ICD-10-CM

## 2023-01-10 DIAGNOSIS — Z15.89 MTHFR GENE MUTATION: ICD-10-CM

## 2023-01-10 DIAGNOSIS — Z34.90 SECOND PREGNANCY: ICD-10-CM

## 2023-01-10 PROCEDURE — 0502F SUBSEQUENT PRENATAL CARE: CPT | Performed by: OBSTETRICS & GYNECOLOGY

## 2023-01-10 NOTE — PROGRESS NOTES
Sandeep Muller is a 20 y.o. female 18w3d        OB History    Para Term  AB Living   2 0 0 0 1 0   SAB IAB Ectopic Molar Multiple Live Births   1 0 0 0          # Outcome Date GA Lbr Khris/2nd Weight Sex Delivery Anes PTL Lv   2 Current            1 SAB 2022               Vitals  BP: 120/76  Weight: 232 lb (105.2 kg)  Heart Rate: 100  Patient Position: Sitting  Albumin: Negative  Glucose: Negative    The patient was seen and evaluated. There was positive fetal movements. No contractions or leakage of fluid. Signs and symptoms of  labor as well as labor were reviewed. The Nuchal Translucency testing was reviewed and found to be normal A single marker MSAFP was reviewed and found to be normal. The patients anatomy ultrasound has been completed and reviewed with patient. TOP  OH Reviewed. A 28 week lab panel was ordered. This includes a (HH, 1 hr GTT, U/A C&S). The patient is to complete this in the next two to four weeks. The S/S of Pre-Eclampsia were reviewed with the patient in detail. She is to report any of these if they occur. She currently denies any of these. The patient is RH positive Rhogam Ordered no    The patient was instructed on fetal kick counts and was given a kick sheet to complete every 8 hours. This is to begin at 28 weeks gestation. She was instructed that the baby should move at a minimum of ten times within one hour after a meal. The patient was instructed to lay down on her left side twenty minutes after eating and count movements for up to one hour with a target value of ten movements. She was instructed to notify the office if she did not make that target after two attempts or if after any attempt there was less than four movements. TDAP @ 27 weeks  Daily baby ASA 81mg PO for prevention of preeclampsia in pregnant women at high risk recommended by USPSTF/ACOG. Assessment:  1. Sandeep Muller is a 20 y.o. female  2.   3. 18w3d    Patient Active Problem List    Diagnosis Date Noted    BMI 37.0-37.9, adult 2022     Priority: High     2022 early 1 hour GTT      Hx of one miscarriage 2022     Priority: High    Second pregnancy 2022     Priority: High    MTHFR gene mutation 10/10/2022     Priority: High    Tachycardia 10/31/2022     Priority: Medium    PCOS (polycystic ovarian syndrome) 2022     Priority: Medium        Diagnosis Orders   1. High-risk pregnancy in second trimester        2. Hx of one miscarriage        3. Second pregnancy        4. BMI 37.0-37.9, adult        5. Tachycardia        6. MTHFR gene mutation        7. 18 weeks gestation of pregnancy              Plan:  The patient will return to the office for her next visit in 4 weeks. See antepartum flow sheet. [facility] Gynecology  [facility] [address]  [phone]    Jocelynkathleen Urrutiaa  1/10/2023  Patient's last menstrual period was 2022 (exact date). INITIAL OBSTETRICAL VISIT EVALUATION:  The patient was seen full history and physical was completed/reviewed. Cytology was collected for patients over 24years of age. Cultures were collected. The patient was counseled on office policies and she was counseled on termination of pregnancy in the state of [place]. The patient was counseled on Toxoplasmosis, HIV, Tobacco Abuse, Group Beta Strep Infections, Cystic Fibrosis,  Labor precautions and Sickle Cell disease. The patient was counseled on the risks of tobacco abuse. Both maternal and fetal. She was instructed to stop smoking if currently using tobacco. Morbidity, mortality, and cessation programs were reviewed. The risks include but are not limited to increased risks of  labor,  delivery, premature rupture of membranes, intrauterine growth restriction, intrauterine fetal demise and abruptio placenta. Secondary smoke risks were also reviewed. Increases in cancer, respiratory problems, and sudden infant death syndrome were reviewed as well. The patient was informed of a 2-4% risk of congenital anomalies in the general population. She was also informed that karyotyping is the only way to evaluate the fetus for genetic problems and genetic lethal anomalies. Chorionic villous sampling, amniocentesis and Maternal Genetic Blood Sampling-(NIPT Testing) were also discussed with morbidity rates in detail. She declined any of the options. Route of delivery and counseling on vaginal, operative vaginal, and  sections were completed with the risks of each to both the patient as well as her baby. The possibility of a blood transfusion was discussed as well. The patient was not opposed to receiving a transfusion if needed. Nuchal translucency and MSAFP single marker testing was reviewed in detail with attention to timing of testing and their windows. For patients beyond the gestational age for Nuchal translucency evaluation, ([id number]) Quad testing was recommended. Timing for the Quad test was reviewed. Benefits of the above testing was reviewed. A second trimester amniocentesis was also made available to the patient. Risks, Benefits and non-invasive alternative testing was reviewed. The patients diagnosed with Advanced maternal age, AMA (age of 27 yo or beyond at delivery), had NIPT recommended. This was discussed in lay terms with Risks and Benefits reviewed. The literature regarding a questionable link to pitocin augmentation and induction of labor, the assistance of labor contractions and the initiation of contractions to help delivery, have been reviewed with the patient regarding the increased potential of having a  with Attention Deficit Hyperactivity Disorder and or Autism. These two disorders and the ramifications of their impact on a child and the family caring for that child has been reviewed with the patient in detail.  She was given the risks, benefits and alternatives of the use of this medication. She has agreed to its use in the delivery of her unborn child if needed at the time of delivery, Yes. The patient was questioned in detail regarding any genetic misnomer history, chromosomal abnormalities, or learning disabilities in  herself, the father of the baby or their families. SHE DENIED ANY HISTORY AS STATED ABOVE: Yes    Upon completion of the visit all questions were answered and the patients follow-up and testing schedule were reviewed. Prenatal vitamins were given. Patient was seen with total face to face time of 20 minutes. More than 50% of this visit was on counseling and education regarding her    Diagnosis Orders   1. High-risk pregnancy in second trimester        2. Hx of one miscarriage        3. Second pregnancy        4. BMI 37.0-37.9, adult        5. Tachycardia        6. MTHFR gene mutation        7. 18 weeks gestation of pregnancy         and her options. She was also counseled on her preventative health maintenance recommendations and follow-up.

## 2023-01-25 ENCOUNTER — HOSPITAL ENCOUNTER (OUTPATIENT)
Age: 23
Discharge: HOME | End: 2023-01-25
Payer: COMMERCIAL

## 2023-01-25 ENCOUNTER — ROUTINE PRENATAL (OUTPATIENT)
Dept: PERINATAL CARE | Age: 23
End: 2023-01-25
Payer: COMMERCIAL

## 2023-01-25 VITALS
HEART RATE: 120 BPM | WEIGHT: 233 LBS | SYSTOLIC BLOOD PRESSURE: 138 MMHG | DIASTOLIC BLOOD PRESSURE: 82 MMHG | HEIGHT: 65 IN | RESPIRATION RATE: 16 BRPM | TEMPERATURE: 97.3 F | BODY MASS INDEX: 38.82 KG/M2

## 2023-01-25 DIAGNOSIS — O44.40 LOW IMPLANTATION OF PLACENTA WITHOUT HEMORRHAGE: ICD-10-CM

## 2023-01-25 DIAGNOSIS — O99.412 MATERNAL CARDIOVASCULAR DISEASE AFFECTING PREGNANCY IN SECOND TRIMESTER: ICD-10-CM

## 2023-01-25 DIAGNOSIS — O35.EXX0 ENCOUNTER FOR REPEAT ULTRASOUND OF FETAL PYELECTASIS, ANTEPARTUM, SINGLE OR UNSPECIFIED FETUS: Primary | ICD-10-CM

## 2023-01-25 DIAGNOSIS — O99.282 METHYLENETETRAHYDROFOLATE REDUCTASE DEFICIENCY AFFECTING PREGNANCY IN SECOND TRIMESTER (HCC): ICD-10-CM

## 2023-01-25 DIAGNOSIS — E72.12 METHYLENETETRAHYDROFOLATE REDUCTASE DEFICIENCY AFFECTING PREGNANCY IN SECOND TRIMESTER (HCC): ICD-10-CM

## 2023-01-25 DIAGNOSIS — O99.212 OBESITY AFFECTING PREGNANCY IN SECOND TRIMESTER: ICD-10-CM

## 2023-01-25 DIAGNOSIS — Z3A.20 20 WEEKS GESTATION OF PREGNANCY: ICD-10-CM

## 2023-01-25 DIAGNOSIS — Z36.86 ENCOUNTER FOR SCREENING FOR RISK OF PRE-TERM LABOR: ICD-10-CM

## 2023-01-25 LAB
ABDOMINAL CIRCUMFERENCE: NORMAL
ABDOMINAL CIRCUMFERENCE: NORMAL
BIPARIETAL DIAMETER: NORMAL
BIPARIETAL DIAMETER: NORMAL
ESTIMATED FETAL WEIGHT: NORMAL
ESTIMATED FETAL WEIGHT: NORMAL
FEMORAL DIAMETER: NORMAL
FEMORAL DIAMETER: NORMAL
HC/AC: NORMAL
HC/AC: NORMAL
HEAD CIRCUMFERENCE: NORMAL
HEAD CIRCUMFERENCE: NORMAL

## 2023-01-25 PROCEDURE — 36415 COLL VENOUS BLD VENIPUNCTURE: CPT

## 2023-01-25 PROCEDURE — 76811 OB US DETAILED SNGL FETUS: CPT | Performed by: OBSTETRICS & GYNECOLOGY

## 2023-01-25 PROCEDURE — 76817 TRANSVAGINAL US OBSTETRIC: CPT | Performed by: OBSTETRICS & GYNECOLOGY

## 2023-01-25 PROCEDURE — 99204 OFFICE O/P NEW MOD 45 MIN: CPT | Performed by: OBSTETRICS & GYNECOLOGY

## 2023-01-25 RX ORDER — ASPIRIN 81 MG/1
81 TABLET ORAL DAILY
COMMUNITY

## 2023-01-26 PROBLEM — O35.EXX0 FETAL RENAL ANOMALY, SINGLE GESTATION: Status: ACTIVE | Noted: 2023-01-26

## 2023-01-26 PROBLEM — O44.40 LOW-LYING PLACENTA: Status: ACTIVE | Noted: 2023-01-26

## 2023-02-08 ENCOUNTER — ROUTINE PRENATAL (OUTPATIENT)
Dept: OBGYN CLINIC | Age: 23
End: 2023-02-08

## 2023-02-08 VITALS
BODY MASS INDEX: 39.94 KG/M2 | HEART RATE: 105 BPM | SYSTOLIC BLOOD PRESSURE: 106 MMHG | WEIGHT: 240 LBS | DIASTOLIC BLOOD PRESSURE: 68 MMHG

## 2023-02-08 DIAGNOSIS — O44.40 LOW-LYING PLACENTA: Primary | ICD-10-CM

## 2023-02-08 DIAGNOSIS — Z34.90 SECOND PREGNANCY: ICD-10-CM

## 2023-02-08 DIAGNOSIS — Z3A.22 22 WEEKS GESTATION OF PREGNANCY: ICD-10-CM

## 2023-02-08 DIAGNOSIS — O35.EXX0 FETAL RENAL ANOMALY, SINGLE GESTATION: ICD-10-CM

## 2023-02-08 DIAGNOSIS — Z87.59 HX OF ONE MISCARRIAGE: ICD-10-CM

## 2023-02-08 DIAGNOSIS — Z15.89 MTHFR GENE MUTATION: ICD-10-CM

## 2023-02-08 PROCEDURE — 0502F SUBSEQUENT PRENATAL CARE: CPT | Performed by: NURSE PRACTITIONER

## 2023-02-08 NOTE — PROGRESS NOTES
Giselle Aguilar is a 20 y.o. female 22w4d        OB History    Para Term  AB Living   2 0 0 0 1 0   SAB IAB Ectopic Molar Multiple Live Births   1 0 0 0          # Outcome Date GA Lbr Khris/2nd Weight Sex Delivery Anes PTL Lv   2 Current            1 SAB 2022               Vitals  BP: 106/68  Weight: 240 lb (108.9 kg)  Heart Rate: (!) 105  Patient Position: Sitting  Fundal Height (cm): 22 cm  Fetal HR: 156  Movement: Present    The patient was seen and evaluated. There was positive fetal movements. No contractions or leakage of fluid. Signs and symptoms of  labor as well as labor were reviewed. The Nuchal Translucency testing was reviewed and found to be normal A single marker MSAFP was reviewed and found to be normal. The patients anatomy ultrasound has been completed and reviewed with patient. TOP ST OH Reviewed. A 28 week lab panel was ordered. This includes a (HH, 1 hr GTT, U/A C&S). The patient is to complete this in the next two to four weeks. The S/S of Pre-Eclampsia were reviewed with the patient in detail. She is to report any of these if they occur. She currently denies any of these. The patient is RH positive Rhogam Ordered yes    The patient was instructed on fetal kick counts and was given a kick sheet to complete every 8 hours. This is to begin at 28 weeks gestation. She was instructed that the baby should move at a minimum of ten times within one hour after a meal. The patient was instructed to lay down on her left side twenty minutes after eating and count movements for up to one hour with a target value of ten movements. She was instructed to notify the office if she did not make that target after two attempts or if after any attempt there was less than four movements. TDAP @ 27 weeks  Daily baby ASA 81mg PO for prevention of preeclampsia in pregnant women at high risk recommended by USPSTF/ACOG. Assessment:  1.  Giselle Aguilar is a 20 y.o. female  2.   3. 22w4d    Patient Active Problem List    Diagnosis Date Noted    Low-lying placenta 2023     Priority: High     Pelvic rest/ no heavy lifting       Fetal renal rigth dilation on 2023     Priority: High    BMI 37.0-37.9, adult 2022     Priority: High     2022 early 1 hour GTT      Hx of one miscarriage 2022     Priority: High    Second pregnancy 2022     Priority: High    MTHFR gene mutation 10/10/2022     Priority: High    Tachycardia 10/31/2022     Priority: Medium    PCOS (polycystic ovarian syndrome) 2022     Priority: Medium        Diagnosis Orders   1. Low-lying placenta        2. BMI 37.0-37.9, adult        3. Hx of one miscarriage        4. Second pregnancy        5. Fetal renal anomaly, single gestation        6. 22 weeks gestation of pregnancy        7. MTHFR gene mutation              Plan:  The patient will return to the office for her next visit in 2 weeks. See antepartum flow sheet. Reviewed s/sx of  labor and preeclampsia  Continue [facility]  [facility] appt with [facility]         Patient was seen with total face to face time of 20 minutes. More than 50% of this visit was on counseling and education regarding her    Diagnosis Orders   1. Low-lying placenta        2. BMI 37.0-37.9, adult        3. Hx of one miscarriage        4. Second pregnancy        5. Fetal renal anomaly, single gestation        6. 22 weeks gestation of pregnancy        7. MTHFR gene mutation         and her options. She was also counseled on her preventative health maintenance recommendations and follow-up.

## 2023-02-23 ENCOUNTER — ROUTINE PRENATAL (OUTPATIENT)
Dept: PERINATAL CARE | Age: 23
End: 2023-02-23
Payer: COMMERCIAL

## 2023-02-23 VITALS
HEIGHT: 65 IN | BODY MASS INDEX: 40.32 KG/M2 | WEIGHT: 242 LBS | RESPIRATION RATE: 16 BRPM | HEART RATE: 104 BPM | DIASTOLIC BLOOD PRESSURE: 71 MMHG | TEMPERATURE: 97.5 F | SYSTOLIC BLOOD PRESSURE: 130 MMHG

## 2023-02-23 DIAGNOSIS — Z3A.24 24 WEEKS GESTATION OF PREGNANCY: ICD-10-CM

## 2023-02-23 DIAGNOSIS — O99.282 METHYLENETETRAHYDROFOLATE REDUCTASE DEFICIENCY AFFECTING PREGNANCY IN SECOND TRIMESTER (HCC): ICD-10-CM

## 2023-02-23 DIAGNOSIS — E72.12 METHYLENETETRAHYDROFOLATE REDUCTASE DEFICIENCY AFFECTING PREGNANCY IN SECOND TRIMESTER (HCC): ICD-10-CM

## 2023-02-23 DIAGNOSIS — O99.412 MATERNAL CARDIOVASCULAR DISEASE AFFECTING PREGNANCY IN SECOND TRIMESTER: ICD-10-CM

## 2023-02-23 DIAGNOSIS — O44.40 LOW IMPLANTATION OF PLACENTA WITHOUT HEMORRHAGE: ICD-10-CM

## 2023-02-23 DIAGNOSIS — Z36.4 ULTRASOUND FOR ANTENATAL SCREENING FOR FETAL GROWTH RESTRICTION: ICD-10-CM

## 2023-02-23 DIAGNOSIS — O35.EXX0 ENCOUNTER FOR REPEAT ULTRASOUND OF FETAL PYELECTASIS, ANTEPARTUM, SINGLE OR UNSPECIFIED FETUS: Primary | ICD-10-CM

## 2023-02-23 DIAGNOSIS — Z03.72 SUSPECTED PLACENTAL PROBLEM NOT FOUND: ICD-10-CM

## 2023-02-23 DIAGNOSIS — O99.212 OBESITY AFFECTING PREGNANCY IN SECOND TRIMESTER: ICD-10-CM

## 2023-02-23 PROCEDURE — 76816 OB US FOLLOW-UP PER FETUS: CPT | Performed by: OBSTETRICS & GYNECOLOGY

## 2023-02-23 PROCEDURE — 76817 TRANSVAGINAL US OBSTETRIC: CPT | Performed by: OBSTETRICS & GYNECOLOGY

## 2023-03-09 ENCOUNTER — ROUTINE PRENATAL (OUTPATIENT)
Dept: OBGYN CLINIC | Age: 23
End: 2023-03-09

## 2023-03-09 VITALS
BODY MASS INDEX: 40.77 KG/M2 | DIASTOLIC BLOOD PRESSURE: 68 MMHG | SYSTOLIC BLOOD PRESSURE: 114 MMHG | HEART RATE: 115 BPM | WEIGHT: 245 LBS

## 2023-03-09 DIAGNOSIS — O44.40 LOW-LYING PLACENTA: Primary | ICD-10-CM

## 2023-03-09 DIAGNOSIS — Z87.59 HX OF ONE MISCARRIAGE: ICD-10-CM

## 2023-03-09 DIAGNOSIS — O35.EXX0 FETAL RENAL ANOMALY, SINGLE GESTATION: ICD-10-CM

## 2023-03-09 DIAGNOSIS — Z34.90 SECOND PREGNANCY: ICD-10-CM

## 2023-03-09 DIAGNOSIS — Z3A.26 26 WEEKS GESTATION OF PREGNANCY: ICD-10-CM

## 2023-03-09 NOTE — PROGRESS NOTES
C/o left sided rib pain x 1 day. Denies CP, SOB, difficulty breathing     Sammi Baldwin is a 20 y.o. female 34w7d        OB History    Para Term  AB Living   2 0 0 0 1 0   SAB IAB Ectopic Molar Multiple Live Births   1 0 0 0          # Outcome Date GA Lbr Khris/2nd Weight Sex Delivery Anes PTL Lv   2 Current            1 SAB 2022               Vitals  BP: 114/68  Weight: 245 lb (111.1 kg)  Heart Rate: (!) 115  Patient Position: Sitting  Albumin: Negative  Glucose: Negative  Fundal Height (cm): 26 cm  Fetal HR: 136  Movement: Present    The patient was seen and evaluated. There was positive fetal movements. No contractions or leakage of fluid. Signs and symptoms of  labor as well as labor were reviewed. The Nuchal Translucency testing was reviewed and found to be normal A single marker MSAFP was reviewed and found to be normal. The patients anatomy ultrasound has been completed and reviewed with patient. TOP ST OH Reviewed. A 28 week lab panel was ordered. This includes a (HH, 1 hr GTT, U/A C&S). The patient is to complete this in the next two to four weeks. The S/S of Pre-Eclampsia were reviewed with the patient in detail. She is to report any of these if they occur. She currently denies any of these. The patient is RH positive Rhogam Ordered no    The patient was instructed on fetal kick counts and was given a kick sheet to complete every 8 hours. This is to begin at 28 weeks gestation. She was instructed that the baby should move at a minimum of ten times within one hour after a meal. The patient was instructed to lay down on her left side twenty minutes after eating and count movements for up to one hour with a target value of ten movements. She was instructed to notify the office if she did not make that target after two attempts or if after any attempt there was less than four movements.       TDAP @ 27 weeks  Daily baby ASA 81mg PO for prevention of preeclampsia in pregnant women at high risk recommended by USPSTF/ACOG. Assessment:  1. Cristopher Walsh is a 20 y.o. female  2.   3. 26w5d    Patient Active Problem List    Diagnosis Date Noted    Low-lying placenta 2023     Priority: High     Pelvic rest/ no heavy lifting       Fetal renal rigth dilation on 2023     Priority: High    BMI 37.0-37.9, adult 2022     Priority: High     2022 early 1 hour GTT      Hx of one miscarriage 2022     Priority: High    Second pregnancy 2022     Priority: High    MTHFR gene mutation 10/10/2022     Priority: High    Tachycardia 10/31/2022     Priority: Medium    PCOS (polycystic ovarian syndrome) 2022     Priority: Medium        Diagnosis Orders   1. Low-lying placenta        2. BMI 37.0-37.9, adult        3. Hx of one miscarriage        4. Second pregnancy        5. Fetal renal anomaly, single gestation        6. 26 weeks gestation of pregnancy  CBC with Auto Differential    Glucose tolerance, 1 hour    Urinalysis with Reflex to Culture            Plan:  The patient will return to the office for her next visit in 2 weeks. See antepartum flow sheet. Lab slip given  Reviewed use of belly band      Patient was seen with total face to face time of 20 minutes. More than 50% of this visit was on counseling and education regarding her    Diagnosis Orders   1. Low-lying placenta        2. BMI 37.0-37.9, adult        3. Hx of one miscarriage        4. Second pregnancy        5. Fetal renal anomaly, single gestation        6. 26 weeks gestation of pregnancy  CBC with Auto Differential    Glucose tolerance, 1 hour    Urinalysis with Reflex to Culture       and her options. She was also counseled on her preventative health maintenance recommendations and follow-up.

## 2023-03-23 ENCOUNTER — ROUTINE PRENATAL (OUTPATIENT)
Dept: PERINATAL CARE | Age: 23
End: 2023-03-23
Payer: COMMERCIAL

## 2023-03-23 VITALS
HEART RATE: 121 BPM | SYSTOLIC BLOOD PRESSURE: 118 MMHG | TEMPERATURE: 97.2 F | RESPIRATION RATE: 16 BRPM | WEIGHT: 247.36 LBS | BODY MASS INDEX: 41.21 KG/M2 | HEIGHT: 65 IN | DIASTOLIC BLOOD PRESSURE: 67 MMHG

## 2023-03-23 DIAGNOSIS — Z13.89 ENCOUNTER FOR ROUTINE SCREENING FOR MALFORMATION USING ULTRASONICS: ICD-10-CM

## 2023-03-23 DIAGNOSIS — O99.213 OBESITY AFFECTING PREGNANCY IN THIRD TRIMESTER: ICD-10-CM

## 2023-03-23 DIAGNOSIS — E72.12 METHYLENETETRAHYDROFOLATE REDUCTASE DEFICIENCY AFFECTING PREGNANCY IN THIRD TRIMESTER (HCC): ICD-10-CM

## 2023-03-23 DIAGNOSIS — O35.EXX0 ENCOUNTER FOR REPEAT ULTRASOUND OF FETAL PYELECTASIS, ANTEPARTUM, SINGLE OR UNSPECIFIED FETUS: Primary | ICD-10-CM

## 2023-03-23 DIAGNOSIS — Z36.4 ULTRASOUND FOR ANTENATAL SCREENING FOR FETAL GROWTH RESTRICTION: ICD-10-CM

## 2023-03-23 DIAGNOSIS — O99.283 METHYLENETETRAHYDROFOLATE REDUCTASE DEFICIENCY AFFECTING PREGNANCY IN THIRD TRIMESTER (HCC): ICD-10-CM

## 2023-03-23 DIAGNOSIS — Z3A.28 28 WEEKS GESTATION OF PREGNANCY: ICD-10-CM

## 2023-03-23 DIAGNOSIS — O99.413 MATERNAL CARDIOVASCULAR DISEASE AFFECTING PREGNANCY IN THIRD TRIMESTER: ICD-10-CM

## 2023-03-23 LAB
ABDOMINAL CIRCUMFERENCE: NORMAL
BIPARIETAL DIAMETER: NORMAL
ESTIMATED FETAL WEIGHT: NORMAL
FEMORAL DIAMETER: NORMAL
HC/AC: NORMAL
HEAD CIRCUMFERENCE: NORMAL

## 2023-03-23 PROCEDURE — 76819 FETAL BIOPHYS PROFIL W/O NST: CPT | Performed by: OBSTETRICS & GYNECOLOGY

## 2023-03-23 PROCEDURE — 99999 PR OFFICE/OUTPT VISIT,PROCEDURE ONLY: CPT | Performed by: OBSTETRICS & GYNECOLOGY

## 2023-03-23 PROCEDURE — 76805 OB US >/= 14 WKS SNGL FETUS: CPT | Performed by: OBSTETRICS & GYNECOLOGY

## 2023-03-24 ENCOUNTER — ROUTINE PRENATAL (OUTPATIENT)
Dept: OBGYN CLINIC | Age: 23
End: 2023-03-24

## 2023-03-24 VITALS
HEART RATE: 104 BPM | DIASTOLIC BLOOD PRESSURE: 66 MMHG | WEIGHT: 245 LBS | SYSTOLIC BLOOD PRESSURE: 118 MMHG | BODY MASS INDEX: 40.77 KG/M2

## 2023-03-24 DIAGNOSIS — Z34.90 SECOND PREGNANCY: ICD-10-CM

## 2023-03-24 DIAGNOSIS — O44.40 LOW-LYING PLACENTA: Primary | ICD-10-CM

## 2023-03-24 DIAGNOSIS — Z87.59 HX OF ONE MISCARRIAGE: ICD-10-CM

## 2023-03-24 DIAGNOSIS — O35.EXX0 FETAL RENAL ANOMALY, SINGLE GESTATION: ICD-10-CM

## 2023-03-24 NOTE — PROGRESS NOTES
Birth 01/05/2023 [id number]   Final    Maternal Weight 01/05/2023 225   Final    Patient Weight Units 01/05/2023 LBS   Final    Due Date 01/05/2023 SEE NOTE   Final    Comment: Results for Estimated Due Date: 06  10  23       Determined by 01/05/2023 LMP CONF by US   Final    Last Menstrual Period 01/05/2023 [id number]   Final    Monochorionic Twins 01/05/2023 NO   Final    Race (Maternal) 01/05/2023 WHITE   Final    Diabetic 01/05/2023 NO   Final    Smoking 01/05/2023 NO   Final    Valproic/Carbamazep 01/05/2023 NO   Final    Fam HX NTD 01/05/2023 NO   Final    In Vitro Fertilization 01/05/2023 NO   Final    Donor Egg? 01/05/2023 NO   Final    Repeat Specimen? 01/05/2023 NO   Final    AFP (Alpha Fetoprotein) 01/05/2023 32  ng/mL Final    AFP Mom 01/05/2023 0.98   Final    AFP Interp 01/05/2023 Screen Neg   Final    Comment: (NOTE)  INTERPRETATION: SCREEN NEGATIVE for open spina bifida                               Neural Tube Defects (NTD)   Negative                                                               Pre-Test     Post-Test    Cutoff                                      Neural Tube Defects Risks   1:1030       < 1:11641    1:250                                       Comments: The risk of an open neural tube defect is less than the  screening cut-off. This test was developed and its performance characteristics   determined by [facility]deep. It has not been cleared or   approved by the [employer] and Drug Administration. This test was   performed in a CLIA certified laboratory and is intended for   clinical purposes.  Maternal Age At JAYY 01/05/2023 23.6  yr Final    Patient Weight 01/05/2023 225.0 lbs.    Final    Est Due Date 01/05/2023 [id number]   Final    Gestational Age 01/05/2023 17 wks, 5 days   Final    Dating Method 01/05/2023 BOTH   Final    Number of Fetuses 01/05/2023 Theodor Acron   Final    Race 01/05/2023 Nonblack   Final   

## 2023-03-29 ENCOUNTER — TELEPHONE (OUTPATIENT)
Dept: OBGYN CLINIC | Age: 23
End: 2023-03-29

## 2023-03-29 ENCOUNTER — HOSPITAL ENCOUNTER (OUTPATIENT)
Age: 23
Discharge: HOME | End: 2023-03-29
Payer: COMMERCIAL

## 2023-03-29 DIAGNOSIS — Z3A.26 26 WEEKS GESTATION OF PREGNANCY: ICD-10-CM

## 2023-03-29 DIAGNOSIS — R73.09 ABNORMAL GTT (GLUCOSE TOLERANCE TEST): Primary | ICD-10-CM

## 2023-03-29 PROBLEM — O99.019 ANEMIA IN PREGNANCY: Status: ACTIVE | Noted: 2023-03-29

## 2023-03-29 LAB
ABSOLUTE EOS #: 0 K/UL (ref 0–0.4)
ABSOLUTE LYMPH #: 2.1 K/UL (ref 1–4.8)
ABSOLUTE MONO #: 0.5 K/UL (ref 0.1–1.3)
BACTERIA: ABNORMAL
BASOPHILS # BLD: 0 % (ref 0–2)
BASOPHILS ABSOLUTE: 0 K/UL (ref 0–0.2)
BILIRUBIN URINE: NEGATIVE
CASTS UA: ABNORMAL /LPF
COLOR: YELLOW
EOSINOPHILS RELATIVE PERCENT: 0 % (ref 0–4)
EPITHELIAL CELLS UA: ABNORMAL /HPF
GLUCOSE ADMINISTRATION: ABNORMAL
GLUCOSE TOLERANCE SCREEN 50G: 146 MG/DL (ref 70–135)
GLUCOSE UR STRIP.AUTO-MCNC: NEGATIVE MG/DL
HCT VFR BLD AUTO: 32.7 % (ref 36–46)
HGB BLD-MCNC: 10.8 G/DL (ref 12–16)
KETONES UR STRIP.AUTO-MCNC: ABNORMAL MG/DL
LEUKOCYTE ESTERASE UR QL STRIP.AUTO: ABNORMAL
LYMPHOCYTES # BLD: 17 % (ref 24–44)
MCH RBC QN AUTO: 28.7 PG (ref 26–34)
MCHC RBC AUTO-ENTMCNC: 32.9 G/DL (ref 31–37)
MCV RBC AUTO: 87.1 FL (ref 80–100)
MONOCYTES # BLD: 4 % (ref 1–7)
NITRITE UR QL STRIP.AUTO: NEGATIVE
PDW BLD-RTO: 14.4 % (ref 11.5–14.9)
PLATELET # BLD AUTO: 184 K/UL (ref 150–450)
PMV BLD AUTO: 9.8 FL (ref 6–12)
PROT UR STRIP.AUTO-MCNC: 7 MG/DL (ref 5–8)
PROT UR STRIP.AUTO-MCNC: NEGATIVE MG/DL
RBC # BLD: 3.75 M/UL (ref 4–5.2)
RBC CLUMPS #/AREA URNS AUTO: ABNORMAL /HPF
SEG NEUTROPHILS: 79 % (ref 36–66)
SEGMENTED NEUTROPHILS ABSOLUTE COUNT: 9.5 K/UL (ref 1.3–9.1)
SPECIFIC GRAVITY UA: 1.01 (ref 1–1.03)
TURBIDITY: ABNORMAL
URINE HGB: NEGATIVE
UROBILINOGEN, URINE: NORMAL
WBC # BLD AUTO: 12.3 K/UL (ref 3.5–11)
WBC UA: ABNORMAL /HPF

## 2023-03-29 PROCEDURE — 81001 URINALYSIS AUTO W/SCOPE: CPT

## 2023-03-29 PROCEDURE — 85025 COMPLETE CBC W/AUTO DIFF WBC: CPT

## 2023-03-29 PROCEDURE — 82950 GLUCOSE TEST: CPT

## 2023-03-29 PROCEDURE — 36415 COLL VENOUS BLD VENIPUNCTURE: CPT

## 2023-03-29 PROCEDURE — 87086 URINE CULTURE/COLONY COUNT: CPT

## 2023-03-29 RX ORDER — FERROUS SULFATE 325(65) MG
325 TABLET ORAL
Qty: 30 TABLET | Refills: 6 | Status: SHIPPED | OUTPATIENT
Start: 2023-03-29

## 2023-03-29 NOTE — TELEPHONE ENCOUNTER
----- Message from RIAZ Robles CNP sent at 3/29/2023  3:02 PM EDT -----  Anemia in pregnancy noted. Ferrous sulfate 325mg PO QD -6 refills.

## 2023-03-29 NOTE — TELEPHONE ENCOUNTER
Patient notified of results and recommendations, order for 3 hour GTT and hemoglobin a1c in epic. Patient notified to fast prior to, and schedule an appointment with the lab. Script for ferrous sulfate to be sent to [facility] in [facility].

## 2023-03-30 ENCOUNTER — TELEPHONE (OUTPATIENT)
Dept: OBGYN CLINIC | Age: 23
End: 2023-03-30

## 2023-03-30 LAB
MICROORGANISM SPEC CULT: NORMAL
SPECIMEN DESCRIPTION: NORMAL

## 2023-03-30 RX ORDER — CEPHALEXIN 500 MG/1
500 CAPSULE ORAL 4 TIMES DAILY
Qty: 40 CAPSULE | Refills: 0 | Status: SHIPPED | OUTPATIENT
Start: 2023-03-30 | End: 2023-04-09

## 2023-03-30 NOTE — TELEPHONE ENCOUNTER
Patient notified of results and recommendations, script for keflex to be sent to [facility] in [place].

## 2023-03-30 NOTE — TELEPHONE ENCOUNTER
----- Message from RIAZ Sifuentes NP sent at 3/30/2023  8:17 AM EDT -----  + UTI   Kelfex 500 mg PO Q6 x 10 days  May need to change once C&S results   Increase water intake to 8-10 glasses per day

## 2023-04-06 ENCOUNTER — ROUTINE PRENATAL (OUTPATIENT)
Dept: OBGYN CLINIC | Age: 23
End: 2023-04-06

## 2023-04-06 VITALS
WEIGHT: 251.2 LBS | SYSTOLIC BLOOD PRESSURE: 110 MMHG | DIASTOLIC BLOOD PRESSURE: 70 MMHG | HEIGHT: 65 IN | HEART RATE: 136 BPM | BODY MASS INDEX: 41.85 KG/M2

## 2023-04-06 DIAGNOSIS — R00.0 TACHYCARDIA: ICD-10-CM

## 2023-04-06 DIAGNOSIS — O35.EXX0 FETAL RENAL ANOMALY, SINGLE GESTATION: ICD-10-CM

## 2023-04-06 DIAGNOSIS — Z87.59 HX OF ONE MISCARRIAGE: ICD-10-CM

## 2023-04-06 DIAGNOSIS — O44.40 LOW-LYING PLACENTA: ICD-10-CM

## 2023-04-06 DIAGNOSIS — R73.09 ABNORMAL GLUCOSE TOLERANCE TEST (GTT): ICD-10-CM

## 2023-04-06 DIAGNOSIS — O09.93 HIGH-RISK PREGNANCY IN THIRD TRIMESTER: Primary | ICD-10-CM

## 2023-04-06 DIAGNOSIS — O99.019 ANTEPARTUM ANEMIA: ICD-10-CM

## 2023-04-06 DIAGNOSIS — Z3A.30 30 WEEKS GESTATION OF PREGNANCY: ICD-10-CM

## 2023-04-06 DIAGNOSIS — Z15.89 MTHFR GENE MUTATION: ICD-10-CM

## 2023-04-06 DIAGNOSIS — Z34.90 SECOND PREGNANCY: ICD-10-CM

## 2023-04-06 PROCEDURE — 0502F SUBSEQUENT PRENATAL CARE: CPT | Performed by: NURSE PRACTITIONER

## 2023-04-06 SDOH — ECONOMIC STABILITY: FOOD INSECURITY: WITHIN THE PAST 12 MONTHS, YOU WORRIED THAT YOUR FOOD WOULD RUN OUT BEFORE YOU GOT MONEY TO BUY MORE.: NEVER TRUE

## 2023-04-06 SDOH — ECONOMIC STABILITY: FOOD INSECURITY: WITHIN THE PAST 12 MONTHS, THE FOOD YOU BOUGHT JUST DIDN'T LAST AND YOU DIDN'T HAVE MONEY TO GET MORE.: NEVER TRUE

## 2023-04-06 SDOH — ECONOMIC STABILITY: HOUSING INSECURITY
IN THE LAST 12 MONTHS, WAS THERE A TIME WHEN YOU DID NOT HAVE A STEADY PLACE TO SLEEP OR SLEPT IN A SHELTER (INCLUDING NOW)?: NO

## 2023-04-06 SDOH — ECONOMIC STABILITY: INCOME INSECURITY: HOW HARD IS IT FOR YOU TO PAY FOR THE VERY BASICS LIKE FOOD, HOUSING, MEDICAL CARE, AND HEATING?: NOT HARD AT ALL

## 2023-04-06 ASSESSMENT — PATIENT HEALTH QUESTIONNAIRE - PHQ9
2. FEELING DOWN, DEPRESSED OR HOPELESS: 0
SUM OF ALL RESPONSES TO PHQ QUESTIONS 1-9: 0
SUM OF ALL RESPONSES TO PHQ9 QUESTIONS 1 & 2: 0
1. LITTLE INTEREST OR PLEASURE IN DOING THINGS: 0
SUM OF ALL RESPONSES TO PHQ QUESTIONS 1-9: 0

## 2023-04-06 NOTE — PROGRESS NOTES
pregnancy        11. Abnormal glucose tolerance test (GTT)         and her options. She was also counseled on her preventative health maintenance recommendations and follow-up.

## 2023-04-20 ENCOUNTER — ROUTINE PRENATAL (OUTPATIENT)
Dept: PERINATAL CARE | Age: 23
End: 2023-04-20
Payer: COMMERCIAL

## 2023-04-20 ENCOUNTER — ROUTINE PRENATAL (OUTPATIENT)
Dept: OBGYN CLINIC | Age: 23
End: 2023-04-20
Payer: COMMERCIAL

## 2023-04-20 VITALS
HEIGHT: 65 IN | SYSTOLIC BLOOD PRESSURE: 109 MMHG | HEART RATE: 114 BPM | BODY MASS INDEX: 41.65 KG/M2 | RESPIRATION RATE: 16 BRPM | WEIGHT: 250 LBS | TEMPERATURE: 97.9 F | DIASTOLIC BLOOD PRESSURE: 65 MMHG

## 2023-04-20 VITALS
WEIGHT: 250 LBS | SYSTOLIC BLOOD PRESSURE: 106 MMHG | DIASTOLIC BLOOD PRESSURE: 58 MMHG | BODY MASS INDEX: 41.6 KG/M2 | HEART RATE: 118 BPM

## 2023-04-20 DIAGNOSIS — E28.2 PCOS (POLYCYSTIC OVARIAN SYNDROME): ICD-10-CM

## 2023-04-20 DIAGNOSIS — Z34.90 SECOND PREGNANCY: ICD-10-CM

## 2023-04-20 DIAGNOSIS — O09.93 HIGH-RISK PREGNANCY IN THIRD TRIMESTER: Primary | ICD-10-CM

## 2023-04-20 DIAGNOSIS — Z87.59 HX OF ONE MISCARRIAGE: ICD-10-CM

## 2023-04-20 DIAGNOSIS — O44.40 LOW-LYING PLACENTA: ICD-10-CM

## 2023-04-20 DIAGNOSIS — O35.EXX0 ENCOUNTER FOR REPEAT ULTRASOUND OF FETAL PYELECTASIS, ANTEPARTUM, SINGLE OR UNSPECIFIED FETUS: Primary | ICD-10-CM

## 2023-04-20 DIAGNOSIS — Z36.4 ULTRASOUND FOR ANTENATAL SCREENING FOR FETAL GROWTH RESTRICTION: ICD-10-CM

## 2023-04-20 DIAGNOSIS — O35.EXX0 FETAL RENAL ANOMALY, SINGLE GESTATION: ICD-10-CM

## 2023-04-20 DIAGNOSIS — R73.09 ABNORMAL GLUCOSE TOLERANCE TEST (GTT): ICD-10-CM

## 2023-04-20 DIAGNOSIS — O99.019 ANTEPARTUM ANEMIA: ICD-10-CM

## 2023-04-20 DIAGNOSIS — Z3A.32 32 WEEKS GESTATION OF PREGNANCY: ICD-10-CM

## 2023-04-20 DIAGNOSIS — Z15.89 MTHFR GENE MUTATION: ICD-10-CM

## 2023-04-20 DIAGNOSIS — E72.12 METHYLENETETRAHYDROFOLATE REDUCTASE DEFICIENCY AFFECTING PREGNANCY IN THIRD TRIMESTER (HCC): ICD-10-CM

## 2023-04-20 DIAGNOSIS — O99.213 OBESITY AFFECTING PREGNANCY IN THIRD TRIMESTER: ICD-10-CM

## 2023-04-20 DIAGNOSIS — R00.0 TACHYCARDIA: ICD-10-CM

## 2023-04-20 DIAGNOSIS — O99.283 METHYLENETETRAHYDROFOLATE REDUCTASE DEFICIENCY AFFECTING PREGNANCY IN THIRD TRIMESTER (HCC): ICD-10-CM

## 2023-04-20 DIAGNOSIS — O99.413 MATERNAL CARDIOVASCULAR DISEASE AFFECTING PREGNANCY IN THIRD TRIMESTER: ICD-10-CM

## 2023-04-20 PROCEDURE — 76816 OB US FOLLOW-UP PER FETUS: CPT | Performed by: OBSTETRICS & GYNECOLOGY

## 2023-04-20 PROCEDURE — 99213 OFFICE O/P EST LOW 20 MIN: CPT | Performed by: OBSTETRICS & GYNECOLOGY

## 2023-04-20 PROCEDURE — 76819 FETAL BIOPHYS PROFIL W/O NST: CPT | Performed by: OBSTETRICS & GYNECOLOGY

## 2023-04-20 NOTE — PROGRESS NOTES
Hannah Wilson is a 20 y.o. female 32w5d        OB History    Para Term  AB Living   2 0 0 0 1 0   SAB IAB Ectopic Molar Multiple Live Births   1 0 0 0          # Outcome Date GA Lbr Khris/2nd Weight Sex Delivery Anes PTL Lv   2 Current            1 SAB 2022               Vitals  BP: (!) 106/58  Weight: 250 lb (113.4 kg)  Heart Rate: (!) 118  Patient Position: Sitting  Albumin: Negative  Glucose: Negative      The patient was seen and evaluated. There was positive fetal movements. No contractions or leakage of fluid. Signs and symptoms of  labor as well as labor were reviewed. The S/S of Pre-Eclampsia were reviewed with the patient in detail. She is to report any of these if they occur. She currently denies any of these. The patient had her 28 week labs completed.   Hospital Outpatient Visit on 2023   Component Date Value Ref Range Status    Color, UA 2023 Yellow  Yellow Final    Turbidity UA 2023 Cloudy (A)  Clear Final    Glucose, Ur 2023 NEGATIVE  NEGATIVE Final    Bilirubin Urine 2023 NEGATIVE  NEGATIVE Final    Ketones, Urine 2023 SMALL (A)  NEGATIVE Final    Specific [place], UA 2023 1.013  1.000 - 1.030 Final    Urine Hgb 2023 NEGATIVE  NEGATIVE Final    pH, UA 2023 7.0  5.0 - 8.0 Final    Protein, UA 2023 NEGATIVE  NEGATIVE Final    Urobilinogen, Urine 2023 Normal  Normal Final    Nitrite, Urine 2023 NEGATIVE  NEGATIVE Final    Leukocyte Esterase, Urine 2023 TRACE (A)  NEGATIVE Final    GLU ADMN 2023 Glucola   Final    Glucose tolerance screen 50g 2023 146 (H)  70 - 135 mg/dL Final    WBC 2023 12.3 (H)  3.5 - 11.0 k/uL Final    RBC 2023 3.75 (L)  4.0 - 5.2 m/uL Final    Hemoglobin 2023 10.8 (L)  12.0 - 16.0 g/dL Final    Hematocrit 2023 32.7 (L)  36 - 46 % Final    MCV 2023 87.1  80 - 100 fL Final    MCH 2023 28.7  26

## 2023-04-20 NOTE — PROGRESS NOTES
Please refer to attached ultrasound report for doctor's evaluation of the clinical information obtained by vital signs, ultrasound, and/or non-stress test along with management recommendation. 2.38

## 2023-05-03 ENCOUNTER — ROUTINE PRENATAL (OUTPATIENT)
Dept: OBGYN CLINIC | Age: 23
End: 2023-05-03
Payer: COMMERCIAL

## 2023-05-03 VITALS
WEIGHT: 255 LBS | DIASTOLIC BLOOD PRESSURE: 76 MMHG | BODY MASS INDEX: 42.43 KG/M2 | HEART RATE: 116 BPM | SYSTOLIC BLOOD PRESSURE: 124 MMHG

## 2023-05-03 DIAGNOSIS — O99.019 ANTEPARTUM ANEMIA: ICD-10-CM

## 2023-05-03 DIAGNOSIS — Z3A.34 34 WEEKS GESTATION OF PREGNANCY: ICD-10-CM

## 2023-05-03 DIAGNOSIS — O09.93 HIGH-RISK PREGNANCY IN THIRD TRIMESTER: Primary | ICD-10-CM

## 2023-05-03 DIAGNOSIS — O35.EXX0 FETAL RENAL ANOMALY, SINGLE GESTATION: ICD-10-CM

## 2023-05-03 DIAGNOSIS — O44.40 LOW-LYING PLACENTA: ICD-10-CM

## 2023-05-03 DIAGNOSIS — Z15.89 MTHFR GENE MUTATION: ICD-10-CM

## 2023-05-03 DIAGNOSIS — Z23 NEED FOR PROPHYLACTIC VACCINATION AGAINST CHOLERA WITH TYPHOID-PARATYPHOID (CHOLERA + TAB) VACCINE: ICD-10-CM

## 2023-05-03 DIAGNOSIS — Z34.90 SECOND PREGNANCY: ICD-10-CM

## 2023-05-03 DIAGNOSIS — Z87.59 HX OF ONE MISCARRIAGE: ICD-10-CM

## 2023-05-03 DIAGNOSIS — R73.09 ABNORMAL GLUCOSE TOLERANCE TEST (GTT): ICD-10-CM

## 2023-05-03 DIAGNOSIS — E28.2 PCOS (POLYCYSTIC OVARIAN SYNDROME): ICD-10-CM

## 2023-05-03 PROCEDURE — 90715 TDAP VACCINE 7 YRS/> IM: CPT | Performed by: NURSE PRACTITIONER

## 2023-05-03 PROCEDURE — 90471 IMMUNIZATION ADMIN: CPT | Performed by: NURSE PRACTITIONER

## 2023-05-03 PROCEDURE — 0502F SUBSEQUENT PRENATAL CARE: CPT | Performed by: NURSE PRACTITIONER

## 2023-05-03 NOTE — PROGRESS NOTES
target after two attempts or if after any attempt there was less than four movements. The patient reports that the targets have been made Yes.  Testing:  Not indicated     Assessment:  1. Ruma Jordan is a 20 y.o. female  2.   3. 34w4d    Patient Active Problem List    Diagnosis Date Noted    Anemia in pregnancy 2023     Priority: High    Abnormal glucose tolerance test (GTT) 2023     Priority: High     3/29/2023 3 hour gTT and hgb a1c ordered      Low-lying placenta 2023     Priority: High     3/29/23 Resolved      Fetal renal right dilation on 2023     Priority: High    BMI 37.0-37.9, adult 2022     Priority: High     2022 early 1 hour GTT      Hx of one miscarriage 2022     Priority: High    Second pregnancy 2022     Priority: High    MTHFR gene mutation 10/10/2022     Priority: High    Tachycardia 10/31/2022     Priority: Medium    PCOS (polycystic ovarian syndrome) 2022     Priority: Medium        Diagnosis Orders   1. High-risk pregnancy in third trimester  Tdap, BOOSTRIX, (age 8 yrs+), IM      2. Antepartum anemia        3. Low-lying placenta        4. Fetal renal anomaly, single gestation        5. BMI 37.0-37.9, adult        6. Hx of one miscarriage        7. Second pregnancy        8. Abnormal glucose tolerance test (GTT)        9. MTHFR gene mutation        10. PCOS (polycystic ovarian syndrome)        11. 34 weeks gestation of pregnancy  Tdap, BOOSTRIX, (age 8 yrs+), IM      12. Need for prophylactic vaccination against cholera with typhoid-paratyphoid (cholera + TAB) vaccine  Tdap, BOOSTRIX, (age 8 yrs+), IM                Plan:  The patient will return to the office for her next visit in 2 weeks. See antepartum flow sheet.    Reviewed s/sx of  labor and preeclampsia  Continue [facility]       Testing Indicated: No  Scheduled with Nursing-Pt notified: No      Patient was seen with total face to

## 2023-05-08 ENCOUNTER — TELEPHONE (OUTPATIENT)
Dept: OBGYN CLINIC | Age: 23
End: 2023-05-08

## 2023-05-08 NOTE — TELEPHONE ENCOUNTER
Patient called to the office she is 35 weeks pregnant with complaints of dizziness that started yesterday. Patient states no headaches, blurred vision, baby is still moving, no vaginal bleeding or discharge. Patient states since the dizziness has started she has drank 4-5 bottles of water, patient reports she is still eating normal (3 meals a day with snacks in between). Per linda patient should increase her water intake 8-10 bottles of water daily, make she she is keeping on her food/water intake, with plenty of protein/ healthy food and snacks. Patient voiced understanding, patient to call office if symptoms worsen or if new symptoms develop.

## 2023-05-18 ENCOUNTER — ROUTINE PRENATAL (OUTPATIENT)
Dept: OBGYN CLINIC | Age: 23
End: 2023-05-18

## 2023-05-18 ENCOUNTER — HOSPITAL ENCOUNTER (OUTPATIENT)
Age: 23
Setting detail: SPECIMEN
Discharge: HOME | End: 2023-05-18

## 2023-05-18 ENCOUNTER — ROUTINE PRENATAL (OUTPATIENT)
Dept: PERINATAL CARE | Age: 23
End: 2023-05-18
Payer: COMMERCIAL

## 2023-05-18 VITALS
SYSTOLIC BLOOD PRESSURE: 121 MMHG | WEIGHT: 256 LBS | RESPIRATION RATE: 16 BRPM | HEART RATE: 114 BPM | BODY MASS INDEX: 42.65 KG/M2 | HEIGHT: 65 IN | DIASTOLIC BLOOD PRESSURE: 77 MMHG | TEMPERATURE: 97.5 F

## 2023-05-18 VITALS
SYSTOLIC BLOOD PRESSURE: 118 MMHG | HEART RATE: 120 BPM | DIASTOLIC BLOOD PRESSURE: 76 MMHG | BODY MASS INDEX: 42.27 KG/M2 | WEIGHT: 254 LBS

## 2023-05-18 DIAGNOSIS — O44.40 LOW-LYING PLACENTA: ICD-10-CM

## 2023-05-18 DIAGNOSIS — O35.EXX0 ENCOUNTER FOR REPEAT ULTRASOUND OF FETAL PYELECTASIS, ANTEPARTUM, SINGLE OR UNSPECIFIED FETUS: Primary | ICD-10-CM

## 2023-05-18 DIAGNOSIS — E72.12 METHYLENETETRAHYDROFOLATE REDUCTASE DEFICIENCY AFFECTING PREGNANCY IN THIRD TRIMESTER (HCC): ICD-10-CM

## 2023-05-18 DIAGNOSIS — O99.413 MATERNAL CARDIOVASCULAR DISEASE AFFECTING PREGNANCY IN THIRD TRIMESTER: ICD-10-CM

## 2023-05-18 DIAGNOSIS — O35.EXX0 FETAL RENAL ANOMALY, SINGLE GESTATION: ICD-10-CM

## 2023-05-18 DIAGNOSIS — O99.019 ANTEPARTUM ANEMIA: Primary | ICD-10-CM

## 2023-05-18 DIAGNOSIS — Z3A.36 36 WEEKS GESTATION OF PREGNANCY: ICD-10-CM

## 2023-05-18 DIAGNOSIS — Z13.89 ENCOUNTER FOR ROUTINE SCREENING FOR MALFORMATION USING ULTRASONICS: ICD-10-CM

## 2023-05-18 DIAGNOSIS — O99.283 METHYLENETETRAHYDROFOLATE REDUCTASE DEFICIENCY AFFECTING PREGNANCY IN THIRD TRIMESTER (HCC): ICD-10-CM

## 2023-05-18 DIAGNOSIS — Z87.59 HX OF ONE MISCARRIAGE: ICD-10-CM

## 2023-05-18 DIAGNOSIS — Z34.90 SECOND PREGNANCY: ICD-10-CM

## 2023-05-18 DIAGNOSIS — Z36.4 ULTRASOUND FOR ANTENATAL SCREENING FOR FETAL GROWTH RESTRICTION: ICD-10-CM

## 2023-05-18 DIAGNOSIS — R73.09 ABNORMAL GLUCOSE TOLERANCE TEST (GTT): ICD-10-CM

## 2023-05-18 DIAGNOSIS — O99.213 OBESITY AFFECTING PREGNANCY IN THIRD TRIMESTER: ICD-10-CM

## 2023-05-18 PROCEDURE — 99999 PR OFFICE/OUTPT VISIT,PROCEDURE ONLY: CPT | Performed by: OBSTETRICS & GYNECOLOGY

## 2023-05-18 PROCEDURE — 76805 OB US >/= 14 WKS SNGL FETUS: CPT | Performed by: OBSTETRICS & GYNECOLOGY

## 2023-05-18 PROCEDURE — 76819 FETAL BIOPHYS PROFIL W/O NST: CPT | Performed by: OBSTETRICS & GYNECOLOGY

## 2023-05-18 NOTE — PROGRESS NOTES
Mari Holden is a 20 y.o. female 36w5d        OB History    Para Term  AB Living   2 0 0 0 1 0   SAB IAB Ectopic Molar Multiple Live Births   1 0 0 0          # Outcome Date GA Lbr Khris/2nd Weight Sex Delivery Anes PTL Lv   2 Current            1 SAB 2022                 Vitals  BP: 118/76  Weight - Scale: 254 lb (115.2 kg)  Pulse: (!) 120  Patient Position: Sitting  Albumin: Negative  Glucose: Negative      The patient was seen and evaluated. There was positive fetal movements. No contractions or leakage of fluid. Signs and symptoms of labor were reviewed. The S/S of Pre-Eclampsia were reviewed with the patient in detail. She is to report any of these if they occur. She currently denies any of these. The patient was instructed on fetal kick counts and was given a kick sheet to complete every 8 hours. She was instructed that the baby should move at a minimum of ten times within one hour after a meal. The patient was instructed to lay down on her left side twenty minutes after eating and count movements for up to one hour with a target value of ten movements. She was instructed to notify the office if she did not make that target after two attempts or if after any attempt there was less than four movements. The patient reports that the targets have been made Yes.    5/3/23 TDAP given    Daily baby ASA 81mg PO for prevention of preeclampsia in pregnant women at high risk recommended by USPSTF/ACOG. T-Dap Vaccine (27-36 weeks) Completed: Completed     Allergies: Allergies as of 2023    (No Known Allergies)       Group Beta Strep collection was completed. Yes   GBS Results:   No visits with results within 1 Week(s) from this visit.    Latest known visit with results is:   Hospital Outpatient Visit on 2023   Component Date Value Ref Range Status    Color, UA 2023 Yellow  Yellow Final    Turbidity UA 2023 Cloudy (A)  Clear Final    Glucose, Ur

## 2023-05-21 LAB
MICROORGANISM SPEC CULT: NORMAL
SPECIMEN DESCRIPTION: NORMAL

## 2023-05-25 ENCOUNTER — ROUTINE PRENATAL (OUTPATIENT)
Dept: OBGYN CLINIC | Age: 23
End: 2023-05-25

## 2023-05-25 VITALS
SYSTOLIC BLOOD PRESSURE: 122 MMHG | DIASTOLIC BLOOD PRESSURE: 78 MMHG | HEART RATE: 125 BPM | BODY MASS INDEX: 42.77 KG/M2 | WEIGHT: 257 LBS

## 2023-05-25 DIAGNOSIS — O09.93 HIGH-RISK PREGNANCY IN THIRD TRIMESTER: Primary | ICD-10-CM

## 2023-05-25 DIAGNOSIS — R73.09 ABNORMAL GLUCOSE TOLERANCE TEST (GTT): ICD-10-CM

## 2023-05-25 DIAGNOSIS — Z87.59 HX OF ONE MISCARRIAGE: ICD-10-CM

## 2023-05-25 DIAGNOSIS — Z3A.37 37 WEEKS GESTATION OF PREGNANCY: ICD-10-CM

## 2023-05-25 DIAGNOSIS — O35.EXX0 FETAL RENAL ANOMALY, SINGLE GESTATION: ICD-10-CM

## 2023-05-25 DIAGNOSIS — O99.019 ANTEPARTUM ANEMIA: ICD-10-CM

## 2023-05-25 DIAGNOSIS — O44.40 LOW-LYING PLACENTA: ICD-10-CM

## 2023-05-25 DIAGNOSIS — Z34.90 SECOND PREGNANCY: ICD-10-CM

## 2023-05-25 NOTE — PROGRESS NOTES
Ezequiel Number is a 20 y.o. female 37w5d        OB History    Para Term  AB Living   2 0 0 0 1 0   SAB IAB Ectopic Molar Multiple Live Births   1 0 0 0          # Outcome Date GA Lbr Khris/2nd Weight Sex Delivery Anes PTL Lv   2 Current            1 SAB 2022                 Vitals  BP: 122/78  Weight - Scale: 257 lb (116.6 kg)  Pulse: (!) 125  Patient Position: Sitting  Albumin: Negative  Glucose: Negative  Fundal Height (cm): 38 cm  Fetal HR: 142  Movement: Present  Presentation: Vertex  Dilation (cm): Closed  Effacement: 20  Station: -2      The patient was seen and evaluated. There was positive fetal movements. No contractions or leakage of fluid. Signs and symptoms of labor were reviewed. The S/S of Pre-Eclampsia were reviewed with the patient in detail. She is to report any of these if they occur. She currently denies any of these. The patient was instructed on fetal kick counts and was given a kick sheet to complete every 8 hours. She was instructed that the baby should move at a minimum of ten times within one hour after a meal. The patient was instructed to lay down on her left side twenty minutes after eating and count movements for up to one hour with a target value of ten movements. She was instructed to notify the office if she did not make that target after two attempts or if after any attempt there was less than four movements. The patient reports that the targets have been made Yes.    5/3/23 TDAP given    Daily baby ASA 81mg PO for prevention of preeclampsia in pregnant women at high risk recommended by USPSTF/ACOG. T-Dap Vaccine (27-36 weeks) Completed: Yes    Allergies: Allergies as of 2023    (No Known Allergies)       Group Beta Strep collection was completed. Yes   GBS Results: neg  No visits with results within 1 Week(s) from this visit.    Latest known visit with results is:   Hospital Outpatient Visit on 2023   Component Date Value

## 2023-05-31 ENCOUNTER — ROUTINE PRENATAL (OUTPATIENT)
Dept: OBGYN CLINIC | Age: 23
End: 2023-05-31

## 2023-05-31 VITALS
HEART RATE: 130 BPM | SYSTOLIC BLOOD PRESSURE: 116 MMHG | WEIGHT: 256 LBS | BODY MASS INDEX: 42.6 KG/M2 | DIASTOLIC BLOOD PRESSURE: 74 MMHG

## 2023-05-31 DIAGNOSIS — R73.09 ABNORMAL GLUCOSE TOLERANCE TEST (GTT): ICD-10-CM

## 2023-05-31 DIAGNOSIS — Z87.59 HX OF ONE MISCARRIAGE: ICD-10-CM

## 2023-05-31 DIAGNOSIS — Z34.90 SECOND PREGNANCY: ICD-10-CM

## 2023-05-31 DIAGNOSIS — O99.019 ANTEPARTUM ANEMIA: Primary | ICD-10-CM

## 2023-05-31 DIAGNOSIS — O44.40 LOW-LYING PLACENTA: ICD-10-CM

## 2023-05-31 DIAGNOSIS — O35.EXX0 FETAL RENAL ANOMALY, SINGLE GESTATION: ICD-10-CM

## 2023-05-31 PROCEDURE — 0502F SUBSEQUENT PRENATAL CARE: CPT | Performed by: OBSTETRICS & GYNECOLOGY

## 2023-05-31 NOTE — PROGRESS NOTES
Fidencio Moeller is a 20 y.o. female 38w4d        OB History    Para Term  AB Living   2 0 0 0 1 0   SAB IAB Ectopic Molar Multiple Live Births   1 0 0 0          # Outcome Date GA Lbr Khris/2nd Weight Sex Delivery Anes PTL Lv   2 Current            1 SAB 2022               Vitals  BP: 116/74  Weight - Scale: 256 lb (116.1 kg)  Pulse: (!) 130  Patient Position: Sitting  Albumin: Negative  Glucose: Negative      The patient was seen and evaluated. There was positive fetal movements. No contractions or leakage of fluid. Signs and symptoms of labor were reviewed. The S/S of Pre-Eclampsia were reviewed with the patient in detail. She is to report any of these if they occur. She currently denies any of these. The patient was instructed on fetal kick counts and was given a kick sheet to complete every 8 hours. She was instructed that the baby should move at a minimum of ten times within one hour after a meal. The patient was instructed to lay down on her left side twenty minutes after eating and count movements for up to one hour with a target value of ten movements. She was instructed to notify the office if she did not make that target after two attempts or if after any attempt there was less than four movements. The patient reports that the targets have been made Yes.    5/3/23 TDAP given    Daily baby ASA 81mg PO for prevention of preeclampsia in pregnant women at high risk recommended by USPSTF/ACOG. T-Dap Vaccine Completed (27-36 weeks): Completed     Allergies: Allergies as of 2023    (No Known Allergies)         Group Beta Strep collection was completed. Yes  GBS Results:   Hospital Outpatient Visit on 2023   Component Date Value Ref Range Status    Specimen Description 2023 . VAGINA   Final    Culture 2023 NEGATIVE FOR GROUP B STREPTOCOCCI   Final   ]        Cervical Exam was:   Fingertip/ 50% cm dilated    The literature regarding a

## 2023-06-07 PROBLEM — Z34.90 SECOND PREGNANCY: Status: RESOLVED | Noted: 2022-11-16 | Resolved: 2023-06-07

## 2023-06-07 PROBLEM — Z3A.39 39 WEEKS GESTATION OF PREGNANCY: Status: ACTIVE | Noted: 2023-06-07

## 2023-06-08 ENCOUNTER — ANESTHESIA EVENT (OUTPATIENT)
Dept: LABOR AND DELIVERY | Age: 23
End: 2023-06-08
Payer: COMMERCIAL

## 2023-06-08 ENCOUNTER — ANESTHESIA (OUTPATIENT)
Dept: LABOR AND DELIVERY | Age: 23
End: 2023-06-08
Payer: COMMERCIAL

## 2023-06-08 PROCEDURE — 6360000002 HC RX W HCPCS: Performed by: NURSE ANESTHETIST, CERTIFIED REGISTERED

## 2023-06-08 PROCEDURE — 2500000003 HC RX 250 WO HCPCS: Performed by: NURSE ANESTHETIST, CERTIFIED REGISTERED

## 2023-06-08 PROCEDURE — 3700000025 EPIDURAL BLOCK: Performed by: STUDENT IN AN ORGANIZED HEALTH CARE EDUCATION/TRAINING PROGRAM

## 2023-06-08 RX ORDER — LIDOCAINE HYDROCHLORIDE AND EPINEPHRINE 15; 5 MG/ML; UG/ML
INJECTION, SOLUTION EPIDURAL PRN
Status: DISCONTINUED | OUTPATIENT
Start: 2023-06-08 | End: 2023-06-09 | Stop reason: SDUPTHER

## 2023-06-08 RX ORDER — LIDOCAINE HYDROCHLORIDE 20 MG/ML
INJECTION, SOLUTION EPIDURAL; INFILTRATION; INTRACAUDAL; PERINEURAL PRN
Status: DISCONTINUED | OUTPATIENT
Start: 2023-06-08 | End: 2023-06-09 | Stop reason: SDUPTHER

## 2023-06-08 RX ORDER — ROPIVACAINE HYDROCHLORIDE 2 MG/ML
INJECTION, SOLUTION EPIDURAL; INFILTRATION; PERINEURAL CONTINUOUS PRN
Status: DISCONTINUED | OUTPATIENT
Start: 2023-06-08 | End: 2023-06-09 | Stop reason: SDUPTHER

## 2023-06-08 RX ORDER — ROPIVACAINE HYDROCHLORIDE 2 MG/ML
INJECTION, SOLUTION EPIDURAL; INFILTRATION; PERINEURAL PRN
Status: DISCONTINUED | OUTPATIENT
Start: 2023-06-08 | End: 2023-06-09 | Stop reason: SDUPTHER

## 2023-06-08 RX ORDER — FENTANYL CITRATE 50 UG/ML
INJECTION, SOLUTION INTRAMUSCULAR; INTRAVENOUS PRN
Status: DISCONTINUED | OUTPATIENT
Start: 2023-06-08 | End: 2023-06-09 | Stop reason: SDUPTHER

## 2023-06-08 RX ADMIN — ROPIVACAINE HYDROCHLORIDE 10 ML/HR: 2 INJECTION, SOLUTION EPIDURAL; INFILTRATION; PERINEURAL at 13:36

## 2023-06-08 RX ADMIN — LIDOCAINE HYDROCHLORIDE,EPINEPHRINE BITARTRATE 3 ML: 15; .005 INJECTION, SOLUTION EPIDURAL; INFILTRATION; INTRACAUDAL; PERINEURAL at 13:29

## 2023-06-08 RX ADMIN — FENTANYL CITRATE 100 MCG: 50 INJECTION, SOLUTION INTRAMUSCULAR; INTRAVENOUS at 23:54

## 2023-06-08 RX ADMIN — Medication 5 ML: at 13:33

## 2023-06-08 RX ADMIN — Medication 5 ML: at 13:30

## 2023-06-08 RX ADMIN — LIDOCAINE HYDROCHLORIDE 8 ML: 20 INJECTION, SOLUTION EPIDURAL; INFILTRATION; INTRACAUDAL; PERINEURAL at 23:54

## 2023-06-08 NOTE — ANESTHESIA PRE PROCEDURE
Department of Anesthesiology  Preprocedure Note       Name:  Sandeep Muller   Age:  20 y.o.  :  2000                                          MRN:  [id number]         Date:  2023      Surgeon: Sherman Weaver    Procedure: Labor epidural    Medications prior to admission:   Prior to Admission medications    Medication Sig Start Date End Date Taking?  Authorizing Provider   ferrous sulfate (IRON 325) 325 (65 Fe) MG tablet Take 1 tablet by mouth daily (with breakfast) 3/29/23   RIAZ Rey NP   folic acid-pyridoxine-cyanocobalamine (FOLTX) 2.5-25-1 MG TABS tablet Take 1 tablet by mouth daily 10/13/22   RIAZ Rey NP   Prenatal Vit-Fe Fumarate-FA (PRENATAL VITAMINS) 28-0.8 MG TABS Take 1 tablet by mouth daily 10/6/22   RIAZ Rey NP       Current medications:    Current Facility-Administered Medications   Medication Dose Route Frequency Provider Last Rate Last Admin    prochlorperazine (COMPAZINE) injection 10 mg  10 mg IntraMUSCular Q6H PRN Inis Seal, DO   10 mg at 23 0510    oxytocin (PITOCIN) 30 units in 500 mL infusion  1-10 aurelio-units/min IntraVENous Continuous Jonathan Jimenez MD 6 mL/hr at 23 1226 6 aurelio-units/min at 23 1226    ropivacaine (NAROPIN) 0.2% injection 0.2%             lactated ringers IV soln infusion   IntraVENous Continuous Elizabeth Francis  mL/hr at 23 0517 New Bag at 23 0517    lactated ringers bolus  500 mL IntraVENous PRN Elizabeth Francis MD        Or    lactated ringers bolus  1,000 mL IntraVENous PRN Elizabeth Francis MD        sodium chloride flush 0.9 % injection 5-40 mL  5-40 mL IntraVENous 2 times per day Elizabeth Francis MD        sodium chloride flush 0.9 % injection 5-40 mL  5-40 mL IntraVENous PRN Elizabeth Francis MD        0.9 % sodium chloride infusion   IntraVENous PRN Elizabeth Francis MD        [facility]etron [facility]) injection 4 mg  4 mg IntraVENous Q6H PRN Elizabeth Francis MD   4 mg at

## 2023-06-08 NOTE — ANESTHESIA PROCEDURE NOTES
Epidural Block    Patient location during procedure: OB  Start time: 6/8/2023 1:23 PM  End time: 6/8/2023 1:33 PM  Reason for block: labor epidural  Staffing  Performed: resident/CRNA   Resident/CRNA: RIAZ Castro CRNA  Epidural  Patient position: sitting  Prep: Betadine  Patient monitoring: continuous pulse ox and frequent blood pressure checks  Approach: midline  Location: L2-3  Injection technique: KINGSLEY air  Guidance: paresthesia technique  Provider prep: mask and sterile gloves  Needle  Needle type: Tuohy   Needle gauge: 17 G  Needle length: 3.5 in  Needle insertion depth: 6 cm  Catheter type: multi-orifice  Catheter size: 19 G  Catheter at skin depth: 12 cm  Test dose: negativeCatheter Secured: tegaderm and tape  Assessment  Sensory level: T6  Hemodynamics: stable  Attempts: 1  Outcomes: uncomplicated and patient tolerated procedure well  Preanesthetic Checklist  Completed: patient identified, IV checked, risks and benefits discussed, equipment checked, pre-op evaluation, timeout performed, anesthesia consent given, oxygen available and monitors applied/VS acknowledged

## 2023-06-09 PROCEDURE — 2500000003 HC RX 250 WO HCPCS: Performed by: NURSE ANESTHETIST, CERTIFIED REGISTERED

## 2023-06-09 PROCEDURE — 6360000002 HC RX W HCPCS: Performed by: NURSE ANESTHETIST, CERTIFIED REGISTERED

## 2023-06-09 PROCEDURE — 2580000003 HC RX 258: Performed by: NURSE ANESTHETIST, CERTIFIED REGISTERED

## 2023-06-09 PROCEDURE — 6360000002 HC RX W HCPCS

## 2023-06-09 RX ORDER — CHLOROPROCAINE HYDROCHLORIDE 30 MG/ML
INJECTION, SOLUTION EPIDURAL; INFILTRATION; INTRACAUDAL; PERINEURAL PRN
Status: DISCONTINUED | OUTPATIENT
Start: 2023-06-09 | End: 2023-06-09 | Stop reason: SDUPTHER

## 2023-06-09 RX ORDER — KETOROLAC TROMETHAMINE 30 MG/ML
INJECTION, SOLUTION INTRAMUSCULAR; INTRAVENOUS PRN
Status: DISCONTINUED | OUTPATIENT
Start: 2023-06-09 | End: 2023-06-09 | Stop reason: SDUPTHER

## 2023-06-09 RX ORDER — DEXAMETHASONE SODIUM PHOSPHATE 10 MG/ML
INJECTION, SOLUTION INTRAMUSCULAR; INTRAVENOUS PRN
Status: DISCONTINUED | OUTPATIENT
Start: 2023-06-09 | End: 2023-06-09 | Stop reason: SDUPTHER

## 2023-06-09 RX ORDER — MORPHINE SULFATE 1 MG/ML
INJECTION, SOLUTION EPIDURAL; INTRATHECAL; INTRAVENOUS PRN
Status: DISCONTINUED | OUTPATIENT
Start: 2023-06-09 | End: 2023-06-09 | Stop reason: SDUPTHER

## 2023-06-09 RX ORDER — ONDANSETRON 2 MG/ML
INJECTION INTRAMUSCULAR; INTRAVENOUS PRN
Status: DISCONTINUED | OUTPATIENT
Start: 2023-06-09 | End: 2023-06-09 | Stop reason: SDUPTHER

## 2023-06-09 RX ORDER — SODIUM CHLORIDE, SODIUM LACTATE, POTASSIUM CHLORIDE, CALCIUM CHLORIDE 600; 310; 30; 20 MG/100ML; MG/100ML; MG/100ML; MG/100ML
INJECTION, SOLUTION INTRAVENOUS CONTINUOUS PRN
Status: DISCONTINUED | OUTPATIENT
Start: 2023-06-09 | End: 2023-06-09 | Stop reason: SDUPTHER

## 2023-06-09 RX ORDER — TRANEXAMIC ACID 100 MG/ML
INJECTION, SOLUTION INTRAVENOUS PRN
Status: DISCONTINUED | OUTPATIENT
Start: 2023-06-09 | End: 2023-06-09 | Stop reason: SDUPTHER

## 2023-06-09 RX ORDER — PHENYLEPHRINE HCL IN 0.9% NACL 0.5 MG/5ML
SYRINGE (ML) INTRAVENOUS PRN
Status: DISCONTINUED | OUTPATIENT
Start: 2023-06-09 | End: 2023-06-09 | Stop reason: SDUPTHER

## 2023-06-09 RX ORDER — PROPOFOL 10 MG/ML
INJECTION, EMULSION INTRAVENOUS PRN
Status: DISCONTINUED | OUTPATIENT
Start: 2023-06-09 | End: 2023-06-09 | Stop reason: SDUPTHER

## 2023-06-09 RX ADMIN — Medication 500 MG: at 03:06

## 2023-06-09 RX ADMIN — CHLOROPROCAINE HYDROCHLORIDE 10 ML: 30 INJECTION, SOLUTION EPIDURAL; INFILTRATION; INTRACAUDAL; PERINEURAL at 03:14

## 2023-06-09 RX ADMIN — PROPOFOL 20 MG: 10 INJECTION, EMULSION INTRAVENOUS at 03:27

## 2023-06-09 RX ADMIN — MORPHINE SULFATE 6 MG: 1 INJECTION, SOLUTION EPIDURAL; INTRATHECAL; INTRAVENOUS at 03:28

## 2023-06-09 RX ADMIN — FENTANYL CITRATE 50 MCG: 50 INJECTION, SOLUTION INTRAMUSCULAR; INTRAVENOUS at 04:07

## 2023-06-09 RX ADMIN — TRANEXAMIC ACID 1000 MG: 100 INJECTION, SOLUTION INTRAVENOUS at 03:29

## 2023-06-09 RX ADMIN — Medication 100 MCG: at 03:19

## 2023-06-09 RX ADMIN — DEXAMETHASONE SODIUM PHOSPHATE 10 MG: 10 INJECTION, SOLUTION INTRAMUSCULAR; INTRAVENOUS at 03:31

## 2023-06-09 RX ADMIN — PROPOFOL 20 MG: 10 INJECTION, EMULSION INTRAVENOUS at 03:30

## 2023-06-09 RX ADMIN — FENTANYL CITRATE 50 MCG: 50 INJECTION, SOLUTION INTRAMUSCULAR; INTRAVENOUS at 03:41

## 2023-06-09 RX ADMIN — SODIUM BICARBONATE 1 MEQ: 84 INJECTION, SOLUTION INTRAVENOUS at 02:56

## 2023-06-09 RX ADMIN — ONDANSETRON 4 MG: 2 INJECTION INTRAMUSCULAR; INTRAVENOUS at 03:24

## 2023-06-09 RX ADMIN — LIDOCAINE HYDROCHLORIDE 5 ML: 20 INJECTION, SOLUTION EPIDURAL; INFILTRATION; INTRACAUDAL; PERINEURAL at 03:08

## 2023-06-09 RX ADMIN — SODIUM BICARBONATE 1 MEQ: 84 INJECTION, SOLUTION INTRAVENOUS at 02:59

## 2023-06-09 RX ADMIN — PROPOFOL 20 MG: 10 INJECTION, EMULSION INTRAVENOUS at 03:23

## 2023-06-09 RX ADMIN — CHLOROPROCAINE HYDROCHLORIDE 5 ML: 30 INJECTION, SOLUTION EPIDURAL; INFILTRATION; INTRACAUDAL; PERINEURAL at 03:17

## 2023-06-09 RX ADMIN — SODIUM CHLORIDE, POTASSIUM CHLORIDE, SODIUM LACTATE AND CALCIUM CHLORIDE: 600; 310; 30; 20 INJECTION, SOLUTION INTRAVENOUS at 02:56

## 2023-06-09 RX ADMIN — SODIUM CHLORIDE, POTASSIUM CHLORIDE, SODIUM LACTATE AND CALCIUM CHLORIDE: 600; 310; 30; 20 INJECTION, SOLUTION INTRAVENOUS at 03:50

## 2023-06-09 RX ADMIN — CHLOROPROCAINE HYDROCHLORIDE 5 ML: 30 INJECTION, SOLUTION EPIDURAL; INFILTRATION; INTRACAUDAL; PERINEURAL at 03:19

## 2023-06-09 RX ADMIN — LIDOCAINE HYDROCHLORIDE 15 ML: 20 INJECTION, SOLUTION EPIDURAL; INFILTRATION; INTRACAUDAL; PERINEURAL at 02:56

## 2023-06-09 RX ADMIN — LIDOCAINE HYDROCHLORIDE 5 ML: 20 INJECTION, SOLUTION EPIDURAL; INFILTRATION; INTRACAUDAL; PERINEURAL at 03:03

## 2023-06-09 RX ADMIN — Medication 909 ML/HR: at 03:23

## 2023-06-09 RX ADMIN — MORPHINE SULFATE 4 MG: 1 INJECTION, SOLUTION EPIDURAL; INTRATHECAL; INTRAVENOUS at 03:26

## 2023-06-09 RX ADMIN — LIDOCAINE HYDROCHLORIDE 5 ML: 20 INJECTION, SOLUTION EPIDURAL; INFILTRATION; INTRACAUDAL; PERINEURAL at 02:59

## 2023-06-09 RX ADMIN — KETOROLAC TROMETHAMINE 30 MG: 30 INJECTION, SOLUTION INTRAMUSCULAR; INTRAVENOUS at 03:31

## 2023-06-09 NOTE — ANESTHESIA POSTPROCEDURE EVALUATION
Department of Anesthesiology  Postprocedure Note    Patient: Maia Houston  MRN: [id number]  YOB: 2000  Date of evaluation: 2023      Procedure Summary     Date: 23 Room / Location: [facility] OR [address] [place] [address]    Anesthesia Start: 1322 Anesthesia Stop: 23 0421    Procedures:        SECTION      Labor Analgesia Diagnosis:       Surgery, elective      (Maternal request)    Surgeons: Esther Romeo DO Responsible Provider: Ezequiel Crane MD    Anesthesia Type: Epidural ASA Status: 2          Anesthesia Type: Epidural    Vince Phase I: Vince Score: 10    Vince Phase II: Vince Score: 10      Anesthesia Post Evaluation    Patient location during evaluation: bedside  Patient participation: complete - patient participated  Level of consciousness: awake  Airway patency: patent  Nausea & Vomiting: no nausea and no vomiting  Complications: no  Cardiovascular status: blood pressure returned to baseline  Respiratory status: acceptable  Hydration status: euvolemic  Comments: BP (!) 124/55   Pulse 98   Temp 97.7 °F (36.5 °C) (Oral)   Resp 24   LMP 2022 (Exact Date)   SpO2 96%

## 2023-06-19 ENCOUNTER — OFFICE VISIT (OUTPATIENT)
Dept: OBGYN CLINIC | Age: 23
End: 2023-06-19

## 2023-06-19 VITALS
WEIGHT: 230 LBS | HEIGHT: 65 IN | BODY MASS INDEX: 38.32 KG/M2 | SYSTOLIC BLOOD PRESSURE: 118 MMHG | DIASTOLIC BLOOD PRESSURE: 76 MMHG

## 2023-06-19 PROCEDURE — 99024 POSTOP FOLLOW-UP VISIT: CPT | Performed by: NURSE PRACTITIONER

## 2023-06-19 NOTE — PROGRESS NOTES
Per Chad Rose NP, pt prevena dressing removed. No redness, drainage, or odor noted. Pt to monitor for these s/sx. Pt tolerated well. To return in 1 week for PP visit.

## 2023-06-22 PROBLEM — O35.EXX0 FETAL RENAL ANOMALY, SINGLE GESTATION: Status: RESOLVED | Noted: 2023-01-26 | Resolved: 2023-06-22

## 2023-06-22 PROBLEM — R73.09 ABNORMAL GLUCOSE TOLERANCE TEST (GTT): Status: RESOLVED | Noted: 2023-03-29 | Resolved: 2023-06-22

## 2023-06-22 PROBLEM — O99.019 ANEMIA IN PREGNANCY: Status: RESOLVED | Noted: 2023-03-29 | Resolved: 2023-06-22

## 2023-06-22 PROBLEM — O44.40 LOW-LYING PLACENTA: Status: RESOLVED | Noted: 2023-01-26 | Resolved: 2023-06-22

## 2023-06-26 ENCOUNTER — OFFICE VISIT (OUTPATIENT)
Dept: OBGYN CLINIC | Age: 23
End: 2023-06-26

## 2023-06-26 VITALS
WEIGHT: 222 LBS | HEIGHT: 65 IN | SYSTOLIC BLOOD PRESSURE: 110 MMHG | DIASTOLIC BLOOD PRESSURE: 64 MMHG | BODY MASS INDEX: 36.99 KG/M2

## 2023-06-26 PROBLEM — Z3A.39 39 WEEKS GESTATION OF PREGNANCY: Status: RESOLVED | Noted: 2023-06-07 | Resolved: 2023-06-26

## 2023-06-26 PROCEDURE — 0503F POSTPARTUM CARE VISIT: CPT | Performed by: NURSE PRACTITIONER

## 2023-07-24 ENCOUNTER — OFFICE VISIT (OUTPATIENT)
Dept: OBGYN CLINIC | Age: 23
End: 2023-07-24

## 2023-07-24 VITALS
DIASTOLIC BLOOD PRESSURE: 72 MMHG | BODY MASS INDEX: 36.42 KG/M2 | HEART RATE: 102 BPM | WEIGHT: 218.6 LBS | SYSTOLIC BLOOD PRESSURE: 100 MMHG | HEIGHT: 65 IN | OXYGEN SATURATION: 99 %

## 2023-07-24 PROCEDURE — 0503F POSTPARTUM CARE VISIT: CPT | Performed by: NURSE PRACTITIONER

## 2023-07-24 RX ORDER — NORGESTIMATE AND ETHINYL ESTRADIOL 7DAYSX3 28
1 KIT ORAL DAILY
Qty: 1 PACKET | Refills: 12 | Status: SHIPPED | OUTPATIENT
Start: 2023-07-24 | End: 2023-07-24

## 2023-07-24 RX ORDER — NORGESTIMATE AND ETHINYL ESTRADIOL 7DAYSX3 28
1 KIT ORAL DAILY
Qty: 1 PACKET | Refills: 12 | Status: SHIPPED | OUTPATIENT
Start: 2023-07-24 | End: 2023-07-24 | Stop reason: CLARIF

## 2023-07-24 RX ORDER — ARIPIPRAZOLE 2 MG/1
2 TABLET ORAL EVERY MORNING
COMMUNITY
Start: 2023-07-18

## 2023-07-24 RX ORDER — ACETAMINOPHEN AND CODEINE PHOSPHATE 120; 12 MG/5ML; MG/5ML
1 SOLUTION ORAL DAILY
Qty: 28 TABLET | Refills: 12 | Status: SHIPPED | OUTPATIENT
Start: 2023-07-24 | End: 2024-07-23

## 2023-07-24 NOTE — PROGRESS NOTES
given.    Pelvic Exam:  Exam deferred. Rectal Exam:  exam declined by patient          Assessment:  1. Postpartum care and examination         Family planning was discussed    Signs and symptoms of mastitis were reviewed. The patient did not have any. Signs and symptoms of post partum depression were discussed the patient did not have any. Tobacco abuse and secondary smoke risks to the mother and her  baby were reviewed. Sudden infant death syndrome was discussed. Cessation and proper protections discussed in detail. Counseling Hormonal Based Birth Control:      The patient was seen and counseled on all forms of birth control both male and female  reversible and non. She is aware that hormonal based birth control may increase her risk of developing a blood clot which may increase her morbidity and or mortality. She was counseled on alternate non hormonal based contraception options. We discussed that smoking and any hormonal based contraception may increase the patients risks of developing these life threatening blood clots. All patients are encouraged to stop smoking at the time of contraceptive counseling. Cessation programs were reviewed. The patient was instructed to use barrier contraception for sexually transmitted disease prevention. The patient was also informed of antibiotics decreasing contraceptive efficacy and the need for barrier contraception from the onset of her antibiotic dosing and through a minimum of thirty days from antibiotic cessation. The life threatening side effect profile was reviewed in detail this includes but is not limited to shortness of breath, chest pain, severe or persistent headaches, or calf pain. If any of these occur the patient has been instructed to stop using her hormonal based contraception, notify the office, and go to the emergency department or call 911.     The patient denied any personal history of blood clots in her leg, lung, or heart and

## 2023-10-25 ENCOUNTER — OFFICE VISIT (OUTPATIENT)
Dept: OBGYN CLINIC | Age: 23
End: 2023-10-25
Payer: COMMERCIAL

## 2023-10-25 ENCOUNTER — HOSPITAL ENCOUNTER (OUTPATIENT)
Age: 23
Setting detail: SPECIMEN
Discharge: HOME | End: 2023-10-25

## 2023-10-25 VITALS
BODY MASS INDEX: 38.49 KG/M2 | WEIGHT: 231 LBS | SYSTOLIC BLOOD PRESSURE: 118 MMHG | DIASTOLIC BLOOD PRESSURE: 82 MMHG | HEIGHT: 65 IN

## 2023-10-25 DIAGNOSIS — Z01.419 VISIT FOR GYNECOLOGIC EXAMINATION: Primary | ICD-10-CM

## 2023-10-25 PROCEDURE — 99395 PREV VISIT EST AGE 18-39: CPT | Performed by: NURSE PRACTITIONER

## 2023-10-25 RX ORDER — ACETAMINOPHEN AND CODEINE PHOSPHATE 120; 12 MG/5ML; MG/5ML
1 SOLUTION ORAL DAILY
Qty: 28 TABLET | Refills: 12 | Status: CANCELLED | OUTPATIENT
Start: 2023-10-25 | End: 2024-10-24

## 2023-10-25 RX ORDER — ACETAMINOPHEN AND CODEINE PHOSPHATE 120; 12 MG/5ML; MG/5ML
1 SOLUTION ORAL DAILY
Qty: 28 TABLET | Refills: 12 | Status: SHIPPED | OUTPATIENT
Start: 2023-10-25 | End: 2024-10-24

## 2023-10-25 NOTE — PROGRESS NOTES
Allergies)       Symptoms of decreased mood absent  Symptoms of anhedonia absent    **If either question is answered in a  positive fashion then complete the PHQ9 Scoring Evaluation and make the appropriate referral**      Immunization status: up to date and documented. Gynecologic History:  Menarche: 6 yo  Menopause at [address] yo     Patient's last menstrual period was 10/03/2023. Sexually Active: Yes    STD History: No     Permanent Sterilization: yes- TL  Reversible Birth Control: No        Hormone Replacement Exposure: No      Genetic Qualified Family History of Breast, Ovarian , Colon or Uterine Cancer: Yes PGM colon age unsure     If YES see scanned worksheet. Preventative Health Testing:    Health Maintenance:  Health Maintenance Due   Topic Date Due    COVID-19 Vaccine (3 - Pfizer series) 02/16/2022    Flu vaccine (1) 08/01/2023    Chlamydia/GC screen  11/16/2023       Date of Last Pap Smear: 9/2022 neg  Abnormal Pap Smear History: denies  Colposcopy History:   Date of Last Mammogram: NA   Date of Last Colonoscopy:   Date of Last Bone Density:      ________________________________________________________________________        REVIEW OF SYSTEMS:    see problem list    A minimum of an eleven point review of systems was completed. Review Of Systems (11 point):  Constitutional: No fever, chills or malaise;  No weight change or fatigue  Head and Eyes: No vision changes, Headache, Dizziness or trauma in last 12 months  ENT ROS: No hearing, Tinnitis, sinus or taste problems  Hematological and Lymphatic ROS:No Lymphoma, Von Willebrand's, Hemophillia or Bleeding History  Psych ROS: No Depression, Homicidal thoughts,suicidal thoughts, or anxiety  Breast ROS: No breast abnormalities or lumps  Respiratory ROS: No SOB, Pneumoniae,Cough, or Pulmonary Embolism   Cardiovascular ROS: No Chest Pain with Exertion, Palpitations, Syncope, Edema, Arrhythmia  Gastrointestinal ROS: No Indigestion, Heartburn, Nausea, none

## 2023-10-31 LAB — CYTOLOGY REPORT: NORMAL

## 2024-04-28 ENCOUNTER — HOSPITAL ENCOUNTER (EMERGENCY)
Age: 24
Discharge: HOME | End: 2024-04-28
Attending: EMERGENCY MEDICINE
Payer: COMMERCIAL

## 2024-04-28 VITALS
WEIGHT: 215 LBS | SYSTOLIC BLOOD PRESSURE: 116 MMHG | BODY MASS INDEX: 35.82 KG/M2 | HEART RATE: 97 BPM | HEIGHT: 65 IN | RESPIRATION RATE: 16 BRPM | DIASTOLIC BLOOD PRESSURE: 79 MMHG | TEMPERATURE: 98 F | OXYGEN SATURATION: 100 %

## 2024-04-28 DIAGNOSIS — R19.7 DIARRHEA, UNSPECIFIED TYPE: ICD-10-CM

## 2024-04-28 DIAGNOSIS — K52.9 GASTROENTERITIS: ICD-10-CM

## 2024-04-28 DIAGNOSIS — R11.2 NAUSEA AND VOMITING, UNSPECIFIED VOMITING TYPE: Primary | ICD-10-CM

## 2024-04-28 LAB
ALBUMIN SERPL-MCNC: 4.4 G/DL (ref 3.5–5.2)
ALP SERPL-CCNC: 119 U/L (ref 35–104)
ALT SERPL-CCNC: 10 U/L (ref 5–33)
ANION GAP SERPL CALCULATED.3IONS-SCNC: 9 MMOL/L (ref 9–17)
AST SERPL-CCNC: 18 U/L
BACTERIA URNS QL MICRO: ABNORMAL
BASOPHILS # BLD: 0 K/UL (ref 0–0.2)
BASOPHILS NFR BLD: 0 % (ref 0–2)
BILIRUB DIRECT SERPL-MCNC: 0.2 MG/DL
BILIRUB INDIRECT SERPL-MCNC: 1.1 MG/DL (ref 0–1)
BILIRUB SERPL-MCNC: 1.3 MG/DL (ref 0.3–1.2)
BILIRUB UR QL STRIP: NEGATIVE
BUN SERPL-MCNC: 13 MG/DL (ref 6–20)
CALCIUM SERPL-MCNC: 8.8 MG/DL (ref 8.6–10.4)
CASTS #/AREA URNS LPF: ABNORMAL /LPF
CHLORIDE SERPL-SCNC: 99 MMOL/L (ref 98–107)
CLARITY UR: ABNORMAL
CO2 SERPL-SCNC: 27 MMOL/L (ref 20–31)
COLOR UR: ABNORMAL
CREAT SERPL-MCNC: 0.6 MG/DL (ref 0.5–0.9)
EOSINOPHIL # BLD: 0 K/UL (ref 0–0.4)
EOSINOPHILS RELATIVE PERCENT: 0 % (ref 0–4)
EPI CELLS #/AREA URNS HPF: ABNORMAL /HPF
ERYTHROCYTE [DISTWIDTH] IN BLOOD BY AUTOMATED COUNT: 14.6 % (ref 11.5–14.9)
GFR SERPL CREATININE-BSD FRML MDRD: >90 ML/MIN/1.73M2
GLUCOSE SERPL-MCNC: 105 MG/DL (ref 70–99)
GLUCOSE UR STRIP-MCNC: NEGATIVE MG/DL
HCT VFR BLD AUTO: 43.3 % (ref 36–46)
HGB BLD-MCNC: 13.8 G/DL (ref 12–16)
HGB UR QL STRIP.AUTO: NEGATIVE
INR PPP: 1.1
KETONES UR STRIP-MCNC: ABNORMAL MG/DL
LEUKOCYTE ESTERASE UR QL STRIP: ABNORMAL
LIPASE SERPL-CCNC: 27 U/L (ref 13–60)
LYMPHOCYTES NFR BLD: 1 K/UL (ref 1–4.8)
LYMPHOCYTES RELATIVE PERCENT: 12 % (ref 24–44)
MCH RBC QN AUTO: 27.7 PG (ref 26–34)
MCHC RBC AUTO-ENTMCNC: 31.8 G/DL (ref 31–37)
MCV RBC AUTO: 87.1 FL (ref 80–100)
MONOCYTES NFR BLD: 0.3 K/UL (ref 0.1–1.3)
MONOCYTES NFR BLD: 4 % (ref 1–7)
NEUTROPHILS NFR BLD: 84 % (ref 36–66)
NEUTS SEG NFR BLD: 7.4 K/UL (ref 1.3–9.1)
NITRITE UR QL STRIP: NEGATIVE
PH UR STRIP: 6.5 [PH] (ref 5–8)
PLATELET # BLD AUTO: 222 K/UL (ref 150–450)
PMV BLD AUTO: 9.2 FL (ref 6–12)
POTASSIUM SERPL-SCNC: 3.7 MMOL/L (ref 3.7–5.3)
PROT SERPL-MCNC: 7.5 G/DL (ref 6.4–8.3)
PROT UR STRIP-MCNC: ABNORMAL MG/DL
PROTHROMBIN TIME: 14 SEC (ref 11.8–14.6)
RBC # BLD AUTO: 4.97 M/UL (ref 4–5.2)
RBC #/AREA URNS HPF: ABNORMAL /HPF
SODIUM SERPL-SCNC: 135 MMOL/L (ref 135–144)
SP GR UR STRIP: 1.04 (ref 1–1.03)
UROBILINOGEN UR STRIP-ACNC: NORMAL EU/DL (ref 0–1)
WBC #/AREA URNS HPF: ABNORMAL /HPF
WBC OTHER # BLD: 8.8 K/UL (ref 3.5–11)

## 2024-04-28 PROCEDURE — 80048 BASIC METABOLIC PNL TOTAL CA: CPT

## 2024-04-28 PROCEDURE — 93005 ELECTROCARDIOGRAM TRACING: CPT | Performed by: EMERGENCY MEDICINE

## 2024-04-28 PROCEDURE — 2580000003 HC RX 258: Performed by: EMERGENCY MEDICINE

## 2024-04-28 PROCEDURE — 85025 COMPLETE CBC W/AUTO DIFF WBC: CPT

## 2024-04-28 PROCEDURE — 6360000002 HC RX W HCPCS: Performed by: EMERGENCY MEDICINE

## 2024-04-28 PROCEDURE — 81001 URINALYSIS AUTO W/SCOPE: CPT

## 2024-04-28 PROCEDURE — 85610 PROTHROMBIN TIME: CPT

## 2024-04-28 PROCEDURE — 96374 THER/PROPH/DIAG INJ IV PUSH: CPT

## 2024-04-28 PROCEDURE — 36415 COLL VENOUS BLD VENIPUNCTURE: CPT

## 2024-04-28 PROCEDURE — 83690 ASSAY OF LIPASE: CPT

## 2024-04-28 PROCEDURE — 99284 EMERGENCY DEPT VISIT MOD MDM: CPT

## 2024-04-28 PROCEDURE — 80076 HEPATIC FUNCTION PANEL: CPT

## 2024-04-28 RX ORDER — ONDANSETRON 4 MG/1
4 TABLET, ORALLY DISINTEGRATING ORAL 3 TIMES DAILY PRN
Qty: 21 TABLET | Refills: 0 | Status: SHIPPED | OUTPATIENT
Start: 2024-04-28

## 2024-04-28 RX ORDER — 0.9 % SODIUM CHLORIDE 0.9 %
1000 INTRAVENOUS SOLUTION INTRAVENOUS ONCE
Status: COMPLETED | OUTPATIENT
Start: 2024-04-28 | End: 2024-04-28

## 2024-04-28 RX ORDER — ONDANSETRON 2 MG/ML
4 INJECTION INTRAMUSCULAR; INTRAVENOUS ONCE
Status: COMPLETED | OUTPATIENT
Start: 2024-04-28 | End: 2024-04-28

## 2024-04-28 RX ADMIN — SODIUM CHLORIDE 1000 ML: 9 INJECTION, SOLUTION INTRAVENOUS at 18:22

## 2024-04-28 RX ADMIN — ONDANSETRON 4 MG: 2 INJECTION INTRAMUSCULAR; INTRAVENOUS at 18:22

## 2024-04-28 NOTE — ED PROVIDER NOTES
by Elvia Gutierrez DO at [facility] L&D OR    KNEE SURGERY  12/2018    TUBAL LIGATION  09/06/2023     CURRENT MEDICATIONS       Previous Medications    ARIPIPRAZOLE (ABILIFY) 2 MG TABLET    Take 1 tablet by mouth every morning    NORETHINDRONE (ORTHO MICRONOR) 0.35 MG TABLET    Take 1 tablet by mouth daily     ALLERGIES     has No Known Allergies.  FAMILY HISTORY     She indicated that her mother is alive. She indicated that her father is alive. She indicated that her sister is alive. She indicated that her brother is alive. She indicated that her maternal grandmother is alive. She indicated that her maternal grandfather is alive. She indicated that her paternal grandmother is alive.     SOCIAL HISTORY       Social History     Tobacco Use    Smoking status: Former     Types: Cigarettes    Smokeless tobacco: Never    Tobacco comments:     socially   Vaping Use    Vaping Use: Never used   Substance Use Topics    Alcohol use: Not Currently    Drug use: Never     PHYSICAL EXAM     INITIAL VITALS: /79   Pulse 97   Temp 98 °F (36.7 °C) (Oral)   Resp 16   Ht 1.651 m (5' 5\")   Wt 97.5 kg (215 lb)   SpO2 99%   BMI 35.78 kg/m²    Physical Exam  Vitals reviewed.   Constitutional:       General: She is not in acute distress.     Appearance: Normal appearance. She is not ill-appearing or toxic-appearing.   HENT:      Head: Normocephalic and atraumatic.      Right Ear: External ear normal.      Left Ear: External ear normal.      Nose: No congestion or rhinorrhea.   Eyes:      Extraocular Movements: Extraocular movements intact.      Pupils: Pupils are equal, round, and reactive to light.   Cardiovascular:      Rate and Rhythm: Normal rate and regular rhythm.      Pulses: Normal pulses.      Heart sounds: Normal heart sounds.   Pulmonary:      Effort: Pulmonary effort is normal. No respiratory distress.      Breath sounds: Normal breath sounds. No wheezing.   Abdominal:      General: Bowel sounds are normal. There

## 2024-04-30 LAB
EKG ATRIAL RATE: 100 BPM
EKG P AXIS: 35 DEGREES
EKG P-R INTERVAL: 122 MS
EKG Q-T INTERVAL: 328 MS
EKG QRS DURATION: 82 MS
EKG QTC CALCULATION (BAZETT): 423 MS
EKG R AXIS: 62 DEGREES
EKG T AXIS: 50 DEGREES
EKG VENTRICULAR RATE: 100 BPM

## 2024-04-30 PROCEDURE — 93010 ELECTROCARDIOGRAM REPORT: CPT | Performed by: INTERNAL MEDICINE

## 2024-06-06 ENCOUNTER — PATIENT MESSAGE (OUTPATIENT)
Dept: OBGYN CLINIC | Age: 24
End: 2024-06-06

## 2024-06-06 DIAGNOSIS — N92.6 MISSED MENSES: Primary | ICD-10-CM

## 2024-06-07 ENCOUNTER — HOSPITAL ENCOUNTER (OUTPATIENT)
Age: 24
Discharge: HOME | End: 2024-06-07
Payer: COMMERCIAL

## 2024-06-07 DIAGNOSIS — N92.6 MISSED MENSES: ICD-10-CM

## 2024-06-07 LAB — B-HCG SERPL EIA 3RD IS-ACNC: <1 MIU/ML

## 2024-06-07 PROCEDURE — 84702 CHORIONIC GONADOTROPIN TEST: CPT

## 2024-06-07 PROCEDURE — 36415 COLL VENOUS BLD VENIPUNCTURE: CPT

## 2024-10-14 RX ORDER — ACETAMINOPHEN AND CODEINE PHOSPHATE 120; 12 MG/5ML; MG/5ML
1 SOLUTION ORAL DAILY
Qty: 28 TABLET | Refills: 12 | Status: SHIPPED | OUTPATIENT
Start: 2024-10-14 | End: 2025-10-14

## 2024-10-14 NOTE — TELEPHONE ENCOUNTER
Patient current on annual, requesting refill on birth control. Has upcoming annual scheduled also.

## 2024-10-25 PROBLEM — E66.09 OTHER OBESITY DUE TO EXCESS CALORIES: Status: ACTIVE | Noted: 2024-01-17

## 2024-10-25 PROBLEM — R42 DIZZINESS AND GIDDINESS: Status: ACTIVE | Noted: 2022-01-18

## 2024-10-25 PROBLEM — F32.1 MAJOR DEPRESSIVE DISORDER, SINGLE EPISODE, MODERATE (HCC): Status: ACTIVE | Noted: 2022-01-18

## 2024-10-25 PROBLEM — F41.1 GENERALIZED ANXIETY DISORDER: Status: ACTIVE | Noted: 2022-01-18

## 2024-10-25 PROBLEM — R63.4 WEIGHT LOSS: Status: ACTIVE | Noted: 2024-03-18

## 2025-06-13 ENCOUNTER — OFFICE VISIT (OUTPATIENT)
Dept: OBGYN CLINIC | Age: 25
End: 2025-06-13
Payer: COMMERCIAL

## 2025-06-13 VITALS
SYSTOLIC BLOOD PRESSURE: 104 MMHG | BODY MASS INDEX: 38.82 KG/M2 | WEIGHT: 233 LBS | DIASTOLIC BLOOD PRESSURE: 64 MMHG | HEIGHT: 65 IN

## 2025-06-13 DIAGNOSIS — L73.1 INGROWN HAIR: ICD-10-CM

## 2025-06-13 DIAGNOSIS — L73.9 FOLLICULITIS: Primary | ICD-10-CM

## 2025-06-13 PROCEDURE — 99213 OFFICE O/P EST LOW 20 MIN: CPT | Performed by: NURSE PRACTITIONER

## 2025-06-13 RX ORDER — MECLIZINE HYDROCHLORIDE 25 MG/1
25 TABLET ORAL 3 TIMES DAILY PRN
COMMUNITY
Start: 2024-11-14

## 2025-06-13 RX ORDER — DEXTROAMPHETAMINE SACCHARATE, AMPHETAMINE ASPARTATE MONOHYDRATE, DEXTROAMPHETAMINE SULFATE AND AMPHETAMINE SULFATE 5; 5; 5; 5 MG/1; MG/1; MG/1; MG/1
20 CAPSULE, EXTENDED RELEASE ORAL EVERY MORNING
COMMUNITY
Start: 2025-01-17

## 2025-06-13 RX ORDER — ZOLPIDEM TARTRATE 10 MG/1
10 TABLET ORAL NIGHTLY PRN
COMMUNITY
Start: 2024-12-16

## 2025-06-13 SDOH — ECONOMIC STABILITY: TRANSPORTATION INSECURITY
IN THE PAST 12 MONTHS, HAS THE LACK OF TRANSPORTATION KEPT YOU FROM MEDICAL APPOINTMENTS OR FROM GETTING MEDICATIONS?: NO

## 2025-06-13 SDOH — ECONOMIC STABILITY: FOOD INSECURITY: WITHIN THE PAST 12 MONTHS, THE FOOD YOU BOUGHT JUST DIDN'T LAST AND YOU DIDN'T HAVE MONEY TO GET MORE.: NEVER TRUE

## 2025-06-13 SDOH — ECONOMIC STABILITY: INCOME INSECURITY: IN THE LAST 12 MONTHS, WAS THERE A TIME WHEN YOU WERE NOT ABLE TO PAY THE MORTGAGE OR RENT ON TIME?: NO

## 2025-06-13 SDOH — ECONOMIC STABILITY: FOOD INSECURITY: WITHIN THE PAST 12 MONTHS, YOU WORRIED THAT YOUR FOOD WOULD RUN OUT BEFORE YOU GOT MONEY TO BUY MORE.: NEVER TRUE

## 2025-06-13 SDOH — ECONOMIC STABILITY: TRANSPORTATION INSECURITY
IN THE PAST 12 MONTHS, HAS LACK OF TRANSPORTATION KEPT YOU FROM MEETINGS, WORK, OR FROM GETTING THINGS NEEDED FOR DAILY LIVING?: NO

## 2025-06-13 ASSESSMENT — PATIENT HEALTH QUESTIONNAIRE - PHQ9
SUM OF ALL RESPONSES TO PHQ QUESTIONS 1-9: 0
9. THOUGHTS THAT YOU WOULD BE BETTER OFF DEAD, OR OF HURTING YOURSELF: NOT AT ALL
3. TROUBLE FALLING OR STAYING ASLEEP: NOT AT ALL
7. TROUBLE CONCENTRATING ON THINGS, SUCH AS READING THE NEWSPAPER OR WATCHING TELEVISION: NOT AT ALL
4. FEELING TIRED OR HAVING LITTLE ENERGY: NOT AT ALL
7. TROUBLE CONCENTRATING ON THINGS, SUCH AS READING THE NEWSPAPER OR WATCHING TELEVISION: NOT AT ALL
8. MOVING OR SPEAKING SO SLOWLY THAT OTHER PEOPLE COULD HAVE NOTICED. OR THE OPPOSITE - BEING SO FIDGETY OR RESTLESS THAT YOU HAVE BEEN MOVING AROUND A LOT MORE THAN USUAL: NOT AT ALL
SUM OF ALL RESPONSES TO PHQ QUESTIONS 1-9: 0
6. FEELING BAD ABOUT YOURSELF - OR THAT YOU ARE A FAILURE OR HAVE LET YOURSELF OR YOUR FAMILY DOWN: NOT AT ALL
10. IF YOU CHECKED OFF ANY PROBLEMS, HOW DIFFICULT HAVE THESE PROBLEMS MADE IT FOR YOU TO DO YOUR WORK, TAKE CARE OF THINGS AT HOME, OR GET ALONG WITH OTHER PEOPLE: NOT DIFFICULT AT ALL
SUM OF ALL RESPONSES TO PHQ QUESTIONS 1-9: 0
1. LITTLE INTEREST OR PLEASURE IN DOING THINGS: NOT AT ALL
4. FEELING TIRED OR HAVING LITTLE ENERGY: NOT AT ALL
5. POOR APPETITE OR OVEREATING: NOT AT ALL
9. THOUGHTS THAT YOU WOULD BE BETTER OFF DEAD, OR OF HURTING YOURSELF: NOT AT ALL
2. FEELING DOWN, DEPRESSED OR HOPELESS: NOT AT ALL
5. POOR APPETITE OR OVEREATING: NOT AT ALL
1. LITTLE INTEREST OR PLEASURE IN DOING THINGS: NOT AT ALL
2. FEELING DOWN, DEPRESSED OR HOPELESS: NOT AT ALL
SUM OF ALL RESPONSES TO PHQ QUESTIONS 1-9: 0
3. TROUBLE FALLING OR STAYING ASLEEP: NOT AT ALL
6. FEELING BAD ABOUT YOURSELF - OR THAT YOU ARE A FAILURE OR HAVE LET YOURSELF OR YOUR FAMILY DOWN: NOT AT ALL
8. MOVING OR SPEAKING SO SLOWLY THAT OTHER PEOPLE COULD HAVE NOTICED. OR THE OPPOSITE, BEING SO FIGETY OR RESTLESS THAT YOU HAVE BEEN MOVING AROUND A LOT MORE THAN USUAL: NOT AT ALL
SUM OF ALL RESPONSES TO PHQ QUESTIONS 1-9: 0
10. IF YOU CHECKED OFF ANY PROBLEMS, HOW DIFFICULT HAVE THESE PROBLEMS MADE IT FOR YOU TO DO YOUR WORK, TAKE CARE OF THINGS AT HOME, OR GET ALONG WITH OTHER PEOPLE: NOT DIFFICULT AT ALL

## 2025-06-13 NOTE — PROGRESS NOTES
component.  Counseling and education regarding her diagnosis listed below and her options regarding those diagnoses were also included in determining her time component.      Diagnosis Orders   1. Folliculitis        2. Ingrown hair             The patient had her preventative health maintenance recommendations and follow-up reviewed with her at the completion of her visit.

## 2025-07-03 ENCOUNTER — HOSPITAL ENCOUNTER (OUTPATIENT)
Age: 25
Setting detail: SPECIMEN
Discharge: HOME | End: 2025-07-03

## 2025-07-03 ENCOUNTER — OFFICE VISIT (OUTPATIENT)
Dept: OBGYN CLINIC | Age: 25
End: 2025-07-03

## 2025-07-03 VITALS
HEIGHT: 65 IN | DIASTOLIC BLOOD PRESSURE: 66 MMHG | WEIGHT: 232 LBS | SYSTOLIC BLOOD PRESSURE: 100 MMHG | BODY MASS INDEX: 38.65 KG/M2

## 2025-07-03 DIAGNOSIS — N89.8 VAGINAL DISCHARGE: ICD-10-CM

## 2025-07-03 DIAGNOSIS — Z01.419 WELL FEMALE EXAM WITH ROUTINE GYNECOLOGICAL EXAM: Primary | ICD-10-CM

## 2025-07-03 ASSESSMENT — PATIENT HEALTH QUESTIONNAIRE - PHQ9
2. FEELING DOWN, DEPRESSED OR HOPELESS: NOT AT ALL
SUM OF ALL RESPONSES TO PHQ QUESTIONS 1-9: 0
1. LITTLE INTEREST OR PLEASURE IN DOING THINGS: NOT AT ALL
SUM OF ALL RESPONSES TO PHQ QUESTIONS 1-9: 0

## 2025-07-03 NOTE — PROGRESS NOTES
History and Physical  [facility]  [facility] [address]  [place], [place]  [address] [phone]   Fax [phone]  Selina Oneill  7/3/2025              24 y.o.  Chief Complaint   Patient presents with    Annual Exam       Patient's last menstrual period was 2025 (exact date).             Primary Care Physician: Yocasta Zacarias, RIAZ - CNP    The patient was seen and examined. She has no chief complaint today and is here for her annual exam.  Her bowels are regular. There are no voiding complaints. She denies any bloating.  She denies vaginal discharge and was counseled on STD's and the need for barrier contraception.     HPI : Selina Oneill is a 24 y.o. female     Annual exam  C/o vaginal discharge  Awaiting IUD placement on 2025 for irregular menses  Hx PCOS  Hx TL     no Bloating  no Early Satiety  no Unexplained weight change of more than 15 lbs  no  PMB  no  PCB  ________________________________________________________________________  OB History    Para Term  AB Living   2 1 1 0 1 1   SAB IAB Ectopic Molar Multiple Live Births   1 0 0 0 0 1      # Outcome Date GA Lbr Khris/2nd Weight Sex Type Anes PTL Lv   2 Term 23 39w6d  4.24 kg (9 lb 5.6 oz) M CS-LTranv EPI N RAJEEV      Name: CORINA,BABY HILLARY CRABTREE      Apgar1: 8  Apgar5: 9   1 SAB 2022             Past Medical History:   Diagnosis Date    Chlamydia contact, treated 2020    MTHFR gene mutation     Tachycardia                                                                    Past Surgical History:   Procedure Laterality Date     SECTION N/A 2023     SECTION performed by Elvia Gutierrez DO at [facility] L[facility] OR    KNEE CARTILAGE SURGERY Right 2024    KNEE SURGERY  2018    TUBAL LIGATION  2023     Family History   Problem Relation Age of Onset    Colon Cancer Paternal Grandmother     No Known Problems Father     Other

## 2025-07-09 LAB
HPV I/H RISK 4 DNA CVX QL NAA+PROBE: NOT DETECTED
HPV SAMPLE: NORMAL
HPV, INTERPRETATION: NORMAL
HPV16 DNA CVX QL NAA+PROBE: NOT DETECTED
HPV18 DNA CVX QL NAA+PROBE: NOT DETECTED
SPECIMEN DESCRIPTION: NORMAL

## 2025-07-16 ENCOUNTER — RESULTS FOLLOW-UP (OUTPATIENT)
Dept: OBGYN CLINIC | Age: 25
End: 2025-07-16

## 2025-07-16 LAB — CYTOLOGY REPORT: NORMAL

## 2025-07-22 ENCOUNTER — CLINICAL SUPPORT (OUTPATIENT)
Dept: OBGYN CLINIC | Age: 25
End: 2025-07-22
Payer: COMMERCIAL

## 2025-07-22 ENCOUNTER — HOSPITAL ENCOUNTER (OUTPATIENT)
Age: 25
Setting detail: SPECIMEN
Discharge: HOME | End: 2025-07-22

## 2025-07-22 DIAGNOSIS — N94.6 DYSMENORRHEA: ICD-10-CM

## 2025-07-22 DIAGNOSIS — N94.6 DYSMENORRHEA: Primary | ICD-10-CM

## 2025-07-22 LAB — B-HCG SERPL EIA 3RD IS-ACNC: <0.2 MIU/ML

## 2025-07-22 PROCEDURE — 99999 PR OFFICE/OUTPT VISIT,PROCEDURE ONLY: CPT | Performed by: NURSE PRACTITIONER

## 2025-07-22 PROCEDURE — 36415 COLL VENOUS BLD VENIPUNCTURE: CPT | Performed by: NURSE PRACTITIONER

## 2025-07-22 NOTE — PROGRESS NOTES
Patient was in the office today for a HCG.    Draw per physician order using sterile technique.  Drawn from the RIGHT AC.

## 2025-07-23 RX ORDER — CIPROFLOXACIN 500 MG/1
500 TABLET, FILM COATED ORAL 2 TIMES DAILY
COMMUNITY
Start: 2025-07-18 | End: 2025-07-25

## 2025-07-24 ENCOUNTER — PROCEDURE VISIT (OUTPATIENT)
Dept: OBGYN CLINIC | Age: 25
End: 2025-07-24

## 2025-07-24 VITALS
BODY MASS INDEX: 37.99 KG/M2 | WEIGHT: 228 LBS | HEIGHT: 65 IN | DIASTOLIC BLOOD PRESSURE: 64 MMHG | SYSTOLIC BLOOD PRESSURE: 98 MMHG

## 2025-07-24 DIAGNOSIS — E28.2 PCOS (POLYCYSTIC OVARIAN SYNDROME): ICD-10-CM

## 2025-07-24 DIAGNOSIS — N94.6 DYSMENORRHEA: Primary | ICD-10-CM

## 2025-07-24 DIAGNOSIS — Z15.89 MTHFR GENE MUTATION: ICD-10-CM

## 2025-07-24 NOTE — PROGRESS NOTES
Tachycardia 10/31/2022     Priority: Medium    PCOS (polycystic ovarian syndrome) 09/19/2022     Priority: Medium    Weight loss 03/18/2024    Other obesity due to excess calories 01/17/2024    PLTCS 6/9/23 M Apg 8/9 Wt 9#5 06/09/2023    Dizziness and giddiness 01/18/2022    Generalized anxiety disorder 01/18/2022    Major depressive disorder, single episode, moderate (HCC) 01/18/2022     Family Planning    reports that she has quit smoking. Her smoking use included cigarettes. She has never used smokeless tobacco.      PLAN:  Family Planning Counseling Completed  Barrier Recommendations  Removal of the Mirena IUD will be in 8 years on 7/24/2033   Annual health follow-up  reviewed  Return to office in 8 weeks for IUD string check  No tampons; anais-pads only x 8 weeks reviewed  String checks reviewed      Orders Placed This Encounter   Procedures    INSERT INTRAUTERINE DEVICE